# Patient Record
Sex: MALE
[De-identification: names, ages, dates, MRNs, and addresses within clinical notes are randomized per-mention and may not be internally consistent; named-entity substitution may affect disease eponyms.]

---

## 2020-06-29 ENCOUNTER — HOSPITAL ENCOUNTER (INPATIENT)
Dept: HOSPITAL 88 - ER | Age: 60
LOS: 8 days | Discharge: HOME | DRG: 871 | End: 2020-07-07
Attending: INTERNAL MEDICINE | Admitting: INTERNAL MEDICINE
Payer: SELF-PAY

## 2020-06-29 VITALS — DIASTOLIC BLOOD PRESSURE: 54 MMHG | SYSTOLIC BLOOD PRESSURE: 127 MMHG

## 2020-06-29 VITALS — DIASTOLIC BLOOD PRESSURE: 51 MMHG | SYSTOLIC BLOOD PRESSURE: 124 MMHG

## 2020-06-29 VITALS — SYSTOLIC BLOOD PRESSURE: 148 MMHG | DIASTOLIC BLOOD PRESSURE: 86 MMHG

## 2020-06-29 VITALS — DIASTOLIC BLOOD PRESSURE: 49 MMHG | SYSTOLIC BLOOD PRESSURE: 109 MMHG

## 2020-06-29 VITALS — SYSTOLIC BLOOD PRESSURE: 140 MMHG | DIASTOLIC BLOOD PRESSURE: 67 MMHG

## 2020-06-29 VITALS — DIASTOLIC BLOOD PRESSURE: 67 MMHG | SYSTOLIC BLOOD PRESSURE: 140 MMHG

## 2020-06-29 VITALS — DIASTOLIC BLOOD PRESSURE: 86 MMHG | SYSTOLIC BLOOD PRESSURE: 148 MMHG

## 2020-06-29 VITALS — BODY MASS INDEX: 24.71 KG/M2 | HEIGHT: 66 IN | WEIGHT: 153.75 LBS

## 2020-06-29 DIAGNOSIS — E87.1: ICD-10-CM

## 2020-06-29 DIAGNOSIS — N17.9: ICD-10-CM

## 2020-06-29 DIAGNOSIS — I48.91: ICD-10-CM

## 2020-06-29 DIAGNOSIS — J44.9: ICD-10-CM

## 2020-06-29 DIAGNOSIS — K44.9: ICD-10-CM

## 2020-06-29 DIAGNOSIS — Z79.01: ICD-10-CM

## 2020-06-29 DIAGNOSIS — G93.41: ICD-10-CM

## 2020-06-29 DIAGNOSIS — D62: ICD-10-CM

## 2020-06-29 DIAGNOSIS — K29.71: ICD-10-CM

## 2020-06-29 DIAGNOSIS — A41.9: Primary | ICD-10-CM

## 2020-06-29 DIAGNOSIS — E11.9: ICD-10-CM

## 2020-06-29 LAB
ALBUMIN SERPL-MCNC: 3.3 G/DL (ref 3.5–5)
ALBUMIN/GLOB SERPL: 1.2 {RATIO} (ref 0.8–2)
ALP SERPL-CCNC: 43 IU/L (ref 40–150)
ALT SERPL-CCNC: 17 IU/L (ref 0–55)
AMMONIA SER-MCNC: 33 UG/DL (ref 31–123)
ANION GAP SERPL CALC-SCNC: 12.6 MMOL/L (ref 8–16)
BASOPHILS # BLD AUTO: 0 10*3/UL (ref 0–0.1)
BASOPHILS # BLD AUTO: 0 10*3/UL (ref 0–0.1)
BASOPHILS NFR BLD AUTO: 0.3 % (ref 0–1)
BASOPHILS NFR BLD AUTO: 0.3 % (ref 0–1)
BUN SERPL-MCNC: 91 MG/DL (ref 7–26)
BUN/CREAT SERPL: 69 (ref 6–25)
CALCIUM SERPL-MCNC: 8.8 MG/DL (ref 8.4–10.2)
CHLORIDE SERPL-SCNC: 97 MMOL/L (ref 98–107)
CK MB SERPL-MCNC: 5.2 NG/ML (ref 0–5)
CK MB SERPL-MCNC: 5.3 NG/ML (ref 0–5)
CK MB SERPL-MCNC: 5.5 NG/ML (ref 0–5)
CK SERPL-CCNC: 134 IU/L (ref 30–200)
CK SERPL-CCNC: 152 IU/L (ref 30–200)
CK SERPL-CCNC: 203 IU/L (ref 30–200)
CO2 SERPL-SCNC: 29 MMOL/L (ref 22–29)
DEPRECATED APTT PLAS QN: 25.2 SECONDS (ref 23.8–35.5)
DEPRECATED INR PLAS: 1.05
DEPRECATED NEUTROPHILS # BLD AUTO: 11.5 10*3/UL (ref 2.1–6.9)
DEPRECATED NEUTROPHILS # BLD AUTO: 12.1 10*3/UL (ref 2.1–6.9)
EGFRCR SERPLBLD CKD-EPI 2021: 56 ML/MIN (ref 60–?)
EOSINOPHIL # BLD AUTO: 0 10*3/UL (ref 0–0.4)
EOSINOPHIL # BLD AUTO: 0 10*3/UL (ref 0–0.4)
EOSINOPHIL NFR BLD AUTO: 0 % (ref 0–6)
EOSINOPHIL NFR BLD AUTO: 0 % (ref 0–6)
ERYTHROCYTE [DISTWIDTH] IN CORD BLOOD: 12.9 % (ref 11.7–14.4)
ERYTHROCYTE [DISTWIDTH] IN CORD BLOOD: 13.2 % (ref 11.7–14.4)
GLOBULIN PLAS-MCNC: 2.7 G/DL (ref 2.3–3.5)
GLUCOSE SERPLBLD-MCNC: 320 MG/DL (ref 74–118)
HCT VFR BLD AUTO: 13 % (ref 38.2–49.6)
HCT VFR BLD AUTO: 13.7 % (ref 38.2–49.6)
HGB BLD-MCNC: 4.3 G/DL (ref 14–18)
HGB BLD-MCNC: 4.6 G/DL (ref 14–18)
LYMPHOCYTES # BLD: 2 10*3/UL (ref 1–3.2)
LYMPHOCYTES # BLD: 2 10*3/UL (ref 1–3.2)
LYMPHOCYTES NFR BLD AUTO: 12.9 % (ref 18–39.1)
LYMPHOCYTES NFR BLD AUTO: 13.4 % (ref 18–39.1)
MCH RBC QN AUTO: 28 PG (ref 28–32)
MCH RBC QN AUTO: 28.1 PG (ref 28–32)
MCHC RBC AUTO-ENTMCNC: 33.1 G/DL (ref 31–35)
MCHC RBC AUTO-ENTMCNC: 33.6 G/DL (ref 31–35)
MCV RBC AUTO: 83.5 FL (ref 81–99)
MCV RBC AUTO: 85 FL (ref 81–99)
MONOCYTES # BLD AUTO: 1 10*3/UL (ref 0.2–0.8)
MONOCYTES # BLD AUTO: 1.1 10*3/UL (ref 0.2–0.8)
MONOCYTES NFR BLD AUTO: 6.8 % (ref 4.4–11.3)
MONOCYTES NFR BLD AUTO: 7 % (ref 4.4–11.3)
NEUTS SEG NFR BLD AUTO: 78.7 % (ref 38.7–80)
NEUTS SEG NFR BLD AUTO: 78.8 % (ref 38.7–80)
PLATELET # BLD AUTO: 214 X10E3/UL (ref 140–360)
PLATELET # BLD AUTO: 253 X10E3/UL (ref 140–360)
POTASSIUM SERPL-SCNC: 4.6 MMOL/L (ref 3.5–5.1)
PROTHROMBIN TIME: 14.4 SECONDS (ref 11.9–14.5)
RBC # BLD AUTO: 1.53 X10E6/UL (ref 4.3–5.7)
RBC # BLD AUTO: 1.64 X10E6/UL (ref 4.3–5.7)
SODIUM SERPL-SCNC: 134 MMOL/L (ref 136–145)

## 2020-06-29 PROCEDURE — 85014 HEMATOCRIT: CPT

## 2020-06-29 PROCEDURE — 71045 X-RAY EXAM CHEST 1 VIEW: CPT

## 2020-06-29 PROCEDURE — 86850 RBC ANTIBODY SCREEN: CPT

## 2020-06-29 PROCEDURE — 85025 COMPLETE CBC W/AUTO DIFF WBC: CPT

## 2020-06-29 PROCEDURE — 82728 ASSAY OF FERRITIN: CPT

## 2020-06-29 PROCEDURE — 36415 COLL VENOUS BLD VENIPUNCTURE: CPT

## 2020-06-29 PROCEDURE — 70450 CT HEAD/BRAIN W/O DYE: CPT

## 2020-06-29 PROCEDURE — 83735 ASSAY OF MAGNESIUM: CPT

## 2020-06-29 PROCEDURE — 83605 ASSAY OF LACTIC ACID: CPT

## 2020-06-29 PROCEDURE — 86900 BLOOD TYPING SEROLOGIC ABO: CPT

## 2020-06-29 PROCEDURE — 94660 CPAP INITIATION&MGMT: CPT

## 2020-06-29 PROCEDURE — 99284 EMERGENCY DEPT VISIT MOD MDM: CPT

## 2020-06-29 PROCEDURE — 85610 PROTHROMBIN TIME: CPT

## 2020-06-29 PROCEDURE — 96372 THER/PROPH/DIAG INJ SC/IM: CPT

## 2020-06-29 PROCEDURE — 85730 THROMBOPLASTIN TIME PARTIAL: CPT

## 2020-06-29 PROCEDURE — 0DB68ZX EXCISION OF STOMACH, VIA NATURAL OR ARTIFICIAL OPENING ENDOSCOPIC, DIAGNOSTIC: ICD-10-PCS | Performed by: INTERNAL MEDICINE

## 2020-06-29 PROCEDURE — 93925 LOWER EXTREMITY STUDY: CPT

## 2020-06-29 PROCEDURE — 87635 SARS-COV-2 COVID-19 AMP PRB: CPT

## 2020-06-29 PROCEDURE — 88305 TISSUE EXAM BY PATHOLOGIST: CPT

## 2020-06-29 PROCEDURE — 85018 HEMOGLOBIN: CPT

## 2020-06-29 PROCEDURE — 83540 ASSAY OF IRON: CPT

## 2020-06-29 PROCEDURE — 84466 ASSAY OF TRANSFERRIN: CPT

## 2020-06-29 PROCEDURE — 82948 REAGENT STRIP/BLOOD GLUCOSE: CPT

## 2020-06-29 PROCEDURE — 82310 ASSAY OF CALCIUM: CPT

## 2020-06-29 PROCEDURE — 93005 ELECTROCARDIOGRAM TRACING: CPT

## 2020-06-29 PROCEDURE — 86920 COMPATIBILITY TEST SPIN: CPT

## 2020-06-29 PROCEDURE — 43239 EGD BIOPSY SINGLE/MULTIPLE: CPT

## 2020-06-29 PROCEDURE — 96361 HYDRATE IV INFUSION ADD-ON: CPT

## 2020-06-29 PROCEDURE — 0DB78ZX EXCISION OF STOMACH, PYLORUS, VIA NATURAL OR ARTIFICIAL OPENING ENDOSCOPIC, DIAGNOSTIC: ICD-10-PCS | Performed by: INTERNAL MEDICINE

## 2020-06-29 PROCEDURE — 81001 URINALYSIS AUTO W/SCOPE: CPT

## 2020-06-29 PROCEDURE — 74470 X-RAY EXAM OF KIDNEY LESION: CPT

## 2020-06-29 PROCEDURE — 85045 AUTOMATED RETICULOCYTE COUNT: CPT

## 2020-06-29 PROCEDURE — 80053 COMPREHEN METABOLIC PANEL: CPT

## 2020-06-29 PROCEDURE — 85651 RBC SED RATE NONAUTOMATED: CPT

## 2020-06-29 PROCEDURE — 82607 VITAMIN B-12: CPT

## 2020-06-29 PROCEDURE — 82140 ASSAY OF AMMONIA: CPT

## 2020-06-29 PROCEDURE — 84484 ASSAY OF TROPONIN QUANT: CPT

## 2020-06-29 PROCEDURE — 82746 ASSAY OF FOLIC ACID SERUM: CPT

## 2020-06-29 PROCEDURE — 97139 UNLISTED THERAPEUTIC PX: CPT

## 2020-06-29 PROCEDURE — 82550 ASSAY OF CK (CPK): CPT

## 2020-06-29 PROCEDURE — 36569 INSJ PICC 5 YR+ W/O IMAGING: CPT

## 2020-06-29 PROCEDURE — 86140 C-REACTIVE PROTEIN: CPT

## 2020-06-29 PROCEDURE — 87040 BLOOD CULTURE FOR BACTERIA: CPT

## 2020-06-29 PROCEDURE — 84443 ASSAY THYROID STIM HORMONE: CPT

## 2020-06-29 PROCEDURE — 78278 ACUTE GI BLOOD LOSS IMAGING: CPT

## 2020-06-29 PROCEDURE — 83036 HEMOGLOBIN GLYCOSYLATED A1C: CPT

## 2020-06-29 PROCEDURE — 80202 ASSAY OF VANCOMYCIN: CPT

## 2020-06-29 PROCEDURE — 71250 CT THORAX DX C-: CPT

## 2020-06-29 PROCEDURE — 93306 TTE W/DOPPLER COMPLETE: CPT

## 2020-06-29 PROCEDURE — 84100 ASSAY OF PHOSPHORUS: CPT

## 2020-06-29 PROCEDURE — 82553 CREATINE MB FRACTION: CPT

## 2020-06-29 PROCEDURE — 88312 SPECIAL STAINS GROUP 1: CPT

## 2020-06-29 PROCEDURE — 99251: CPT

## 2020-06-29 PROCEDURE — 80061 LIPID PANEL: CPT

## 2020-06-29 PROCEDURE — 82947 ASSAY GLUCOSE BLOOD QUANT: CPT

## 2020-06-29 PROCEDURE — 82270 OCCULT BLOOD FECES: CPT

## 2020-06-29 RX ADMIN — INSULIN LISPRO SCH UNIT: 100 INJECTION, SOLUTION INTRAVENOUS; SUBCUTANEOUS at 13:40

## 2020-06-29 RX ADMIN — INSULIN LISPRO SCH UNIT: 100 INJECTION, SOLUTION INTRAVENOUS; SUBCUTANEOUS at 21:00

## 2020-06-29 RX ADMIN — Medication SCH MLS/HR: at 22:17

## 2020-06-29 RX ADMIN — VANCOMYCIN HYDROCHLORIDE SCH MLS/HR: 500 INJECTION, SOLUTION INTRAVENOUS at 14:32

## 2020-06-29 RX ADMIN — OXYCODONE HYDROCHLORIDE AND ACETAMINOPHEN SCH MG: 500 TABLET ORAL at 17:00

## 2020-06-29 RX ADMIN — Medication SCH MLS/HR: at 22:12

## 2020-06-29 RX ADMIN — SODIUM CHLORIDE SCH MG: 900 INJECTION INTRAVENOUS at 19:54

## 2020-06-29 RX ADMIN — CEFEPIME HYDROCHLORIDE SCH MLS/HR: 1 INJECTION, POWDER, FOR SOLUTION INTRAMUSCULAR; INTRAVENOUS at 13:58

## 2020-06-29 RX ADMIN — Medication SCH MG: at 17:00

## 2020-06-29 RX ADMIN — INSULIN LISPRO SCH UNIT: 100 INJECTION, SOLUTION INTRAVENOUS; SUBCUTANEOUS at 20:02

## 2020-06-29 NOTE — CONSULTATION
DATE OF CONSULTATION:  06/29/2020  

 

REASON FOR CONSULTATION:  To see if the patient has loss of blood since he has a

hemoglobin of 4.3, it is coming from his foot. 

 

History could not be taken due to the fact that the patient is not responsive to verbal

stimuli. 

 

PHYSICAL EXAMINATION:

His foot was inspected.  He has a grade 1/2 ulceration plantar aspect right foot with no

active bleeding.  There is some mild periwound cellulitis present with pedal pulses

diminished. 

ASSESSMENT:  Grade 2 ulceration with cellulitis.

 

PLAN:  We will start Bactroban ointment b.i.d., offloading.  Upon discharge, the patient

is to follow up in the office.  The bleeding, loss of blood is not coming from his foot. 

 

 

 

 

______________________________

OLIVER Ulloa/KARIS

D:  06/29/2020 16:49:43

T:  06/29/2020 22:35:32

Job #:  [id number]

## 2020-06-29 NOTE — NUR
NURSE AND NP EPI JORDAN IN PATIENT'S ROOM OBSERVING HIS APNEA EPISODES, DAUGHTER AT BEDSIDE 
STATING SHE HEARS HIM AT HOME FOR YEARS SNORING AND SLEEPING LIKE THIS BUT SHE'S NEVER SEEN 
HIM IN PERSON DO THIS SINCE HE MOVED TO HER OTHER SISTER'S HOUSE. BIPAP ORDERED FOR PATIENT. 
EPI JORDAN, NP, DR DAWKINS AND NURSE DISCUSSED PT CARE AT DESK AND CONCLUDED THAT PT NEEDS 
PULMONARY CONSULT DUE TO THE SEVERE APNEA SINCE DR DAWKINS NEEDS TO DO EGD/COLONOSCOPY BUT IS 
CONCERNED ABOUT ANESTHESIA WITH APNEA THIS SEVERE. CONSULT PLACED FOR DR TENORIO TO ASSESS 
PATIENT AND CLEAR PATIENT FOR PROCEDURE. PATIENT NOW ON BIPAP 14/9 16R 30% O2 AND RESTING 
COMFORTABLY WITH O2 %, DAUGHTER STILL AT BEDSIDE AS SITTER AND PT STILL RESTRAINED AT 
THIS TIME. WILL CONT TO MONITOR.

## 2020-06-29 NOTE — EMERGENCY DEPARTMENT NOTE
History of Present Illnes


History of Present Illness


Chief Complaint:  General Medicine Complaints


History of Present Illness


This is a 59 year old  male 60 Y/O MALE PT ALERT TO PERSON AND PLACE 

PRESENTS TO ED PALE IN COLOR WITH WEAKNESS SINCE YESTERDAY. +BLACK STOOLS


Historian:  Patient


Arrival Mode:  Car


 Required:  No


Onset (how long ago):  day(s) (2)


Radiation:  Reports non-radiation


Severity:  moderate


Onset quality:  gradual


Timing of current episode:  constant


Progression:  worsening


Chronicity:  new


Context:  Denies recent illness


Relieving factors:  none


Exacerbating factors:  none


Associated symptoms:  Reports denies other symptoms, Reports weakness


Treatments prior to arrival:  none





Past Medical/Family History


Physician Review


I have reviewed the patient's past medical and family history.  Any updates have

been documented here.





Past Medical History


Recent Fever:  No


Clinical Suspicion of Infectio:  Yes


New/Unexplained Change in Ment:  Yes


Past Medical History:  Diabetes


Past Surgical History:  None





Social History


Smoking Cessation:  Never Smoker


Counseling Performed:  No


Alcohol Use:  Occasional


Any Illegal Drug Use:  No


TB Exposure/Symptoms:  No


Physically hurt or threatened:  No





Family History


Family history of heart diseas:  No





Other


Last Tetanus:  UNK


Any Pre-Existing Lines (PICC,:  No


Is patient up to date on immun:  Yes


Last Flu:  unknown


Last Pneumovax:  unknown





Review of Systems


Review of Systems


Constitutional:  Reports as per HPI, Reports weakness


EENTM:  Reports no symptoms


Cardiovascular:  Reports no symptoms


Respiratory:  Reports no symptoms


Gastrointestinal:  Reports other (BLACK STOOLS)


Genitourinary:  Reports no symptoms


Musculoskeletal:  Reports no symptoms


Integumentary:  Reports no symptoms


Neurological:  Reports weakness


Psychological:  Reports no symptoms


Endocrine:  Reports no symptoms


Hematological/Lymphatic:  Reports no symptoms





Physical Exam


Related Data


Allergies:  


Coded Allergies:  


     No Known Allergies (Unverified , 6/29/20)


Triage Vital Signs





Vital Signs








  Date Time  Temp Pulse Resp B/P (MAP) Pulse Ox O2 Delivery O2 Flow Rate FiO2


 


6/29/20 07:06 99.8 102 14 151/64 100   








Vital signs reviewed:  Yes





Physical Exam


CONSTITUTIONAL





Constitutional:  Present well-developed, Present well-nourished


HENT


HENT:  Present normocephalic, Present atraumatic, Present oropharynx 

clear/moist, Present nose normal


HENT L/R:  Present left ext ear normal, Present right ext ear normal


EYES





Eyes:  Reports PERRL, Reports conjunctivae normal


NECK


Neck:  Present ROM normal


PULMONARY


Pulmonary:  Present effort normal, Present breath sounds normal


CARDIOVASCULAR





Cardiovascular:  Present regular rhythm, Present heart sounds normal, Present 

capillary refill normal, Present normal rate


GASTROINTESTINAL





Abdominal:  Present soft, Present nontender, Present bowel sounds normal; 


   Absent tender


GENITOURINARY





Genitourinary:  Present guaiac result (POSITIVE, MELENA)


SKIN


Skin:  Present warm, Present dry, Present pale


MUSCULOSKELETAL





Musculoskeletal:  Present ROM normal


NEUROLOGICAL





Neurological:  Present alert, Present DTRs normal, Present no gross motor or 

sensory deficits, Present other (ORIENTED TO NAME/PLACE, NOT YEAR); 


   Absent cranial nerve deficit, Absent sensory deficit, Absent weakness


PSYCHOLOGICAL


Psychological:  Present mood/affect normal, Present judgement normal





Results


Laboratory


Result Diagram:  


6/29/20 0640                                                                    

           6/29/20 0640





Laboratory





Laboratory Tests








Test


 6/29/20


07:25 6/29/20


06:40


 


Stool Occult Blood


 Positive


(NEGATIVE) 





 


White Blood Count


 


 14.61 x10e3/uL


(4.8-10.8)


 


Red Blood Count


 


 1.64 x10e6/uL


(4.3-5.7)


 


Hemoglobin


 


 4.6 g/dL


(14.0-18.0)


 


Hematocrit


 


 13.7 %


(38.2-49.6)


 


Mean Corpuscular Volume


 


 83.5 fL


(81-99)


 


Mean Corpuscular Hemoglobin


 


 28.0 pg


(28-32)


 


Mean Corpuscular Hemoglobin


Concent 


 33.6 g/dL


(31-35)


 


Red Cell Distribution Width


 


 12.9 %


(11.7-14.4)


 


Platelet Count


 


 253 x10e3/uL


(140-360)


 


Neutrophils (%) (Auto)


 


 78.7 %


(38.7-80.0)


 


Lymphocytes (%) (Auto)


 


 13.4 %


(18.0-39.1)


 


Monocytes (%) (Auto)


 


 6.8  %


(4.4-11.3)


 


Eosinophils (%) (Auto)


 


 0.0 %


(0.0-6.0)


 


Basophils (%) (Auto)


 


 0.3 %


(0.0-1.0)


 


Neutrophils # (Auto)  11.5 (2.1-6.9) 


 


Lymphocytes # (Auto)  2.0 (1.0-3.2) 


 


Monocytes # (Auto)  1.0 (0.2-0.8) 


 


Eosinophils # (Auto)  0.0 (0.0-0.4) 


 


Basophils # (Auto)  0.0 (0.0-0.1) 


 


Absolute Immature Granulocyte


(auto 


 0.11 x10e3/uL


(0-0.1)


 


Sodium Level


 


 134 mmol/L


(136-145)


 


Potassium Level


 


 4.6 mmol/L


(3.5-5.1)


 


Chloride Level


 


 97 mmol/L


()


 


Carbon Dioxide Level


 


 29 mmol/L


(22-29)


 


Anion Gap


 


 12.6 mmol/L


(8-16)


 


Blood Urea Nitrogen


 


 91 mg/dL


(7-26)


 


Creatinine


 


 1.31 mg/dL


(0.72-1.25)


 


Estimat Glomerular Filtration


Rate 


 56 ML/MIN


(60-)


 


BUN/Creatinine Ratio  69 (6-25) 


 


Glucose Level


 


 320 mg/dL


()


 


Lactic Acid Level


 


 1.3 mmol/L


(0.5-2.0)


 


Calcium Level


 


 8.8 mg/dL


(8.4-10.2)


 


Total Bilirubin


 


 0.2 mg/dL


(0.2-1.2)


 


Aspartate Amino Transf


(AST/SGOT) 


 16 IU/L (5-34) 





 


Alanine Aminotransferase


(ALT/SGPT) 


 17 IU/L (0-55) 





 


Alkaline Phosphatase


 


 43 IU/L


()


 


Ammonia


 


 33 UG/DL


()


 


Creatine Kinase


 


 134 IU/L


()


 


Creatine Kinase MB


 


 5.30 ng/mL


(0-5.0)


 


Troponin I


 


 0.014 ng/mL


(0-0.300)


 


Total Protein


 


 6.0 g/dL


(6.5-8.1)


 


Albumin


 


 3.3 g/dL


(3.5-5.0)


 


Globulin


 


 2.7 g/dL


(2.3-3.5)


 


Albumin/Globulin Ratio  1.2 (0.8-2.0) 








Lab results reviewed:  Yes





Imaging


Imaging results reviewed:  Yes


Impressions


Procedure: [id number] CT/CT BRAIN WO


Exam Date: 06/29/20                            Exam Time: 0758








                              REPORT STATUS: Signed





CT BRAIN WO





HISTORY: Altered mental status





COMPARISON:  None.





Technique: 


Noncontrast axial scans were obtained from skull base to the vertex.  Coronal


and sagittal reconstructions obtained from the axial data.  One or more of the


following dose reduction techniques were used: Automated exposure control,


adjustment of the mA and/or kV according to patient size, and/or utilization of


iterative reconstruction technique.





DISCUSSION:





Scalp/Skull: Unremarkable.


Brain sulci: Mildly prominent.


Ventricles: Mild compensatory dilatation. Approximately 5 mm nodular hyperdense


lesion in the roof of the third ventricle is compatible with a colloid cyst.


Extra-axial spaces: No masses or fluid collections. Carotid siphon


calcifications are present.





Parenchyma: 


Mild bilateral deep white matter hypodensity is likely chronic microvascular


ischemic change. 


Otherwise, no masses, hemorrhage, or large vascular territory acute infarct.





Dural sinuses:  No abnormal densities.


Sellar/Suprasellar region: Intact.


Skull base: Intact.


Incidental findings: None.





IMPRESSION:


1.  No acute intracranial abnormalities.


2.  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


foramina of Erick.


3.  Mild supratentorial chronic microvascular ischemic change. Mild generalized


cerebral volume loss.








Signed by: Dr. Oswald Maradiaga M.D. on 6/29/2020 8:35 AM








Procedure: [id number] DX/CHEST SINGLE (PORTABLE)


Exam Date: 06/29/20                            Exam Time: 0758








                              REPORT STATUS: Signed





EXAMINATION:  CHEST SINGLE (PORTABLE)    





INDICATION: Altered mental status





COMPARISON: None


     


FINDINGS:





LINES/TUBES:None





LUNGS:The lungs are moderately inflated. No focal consolidation or pulmonary


edema.





PLEURA:No pleural effusion or pneumothorax.





MEDIASTINUM:The cardiomediastinal silhouette appears normal in size and shape.





BONES/SOFT TISSUES:No acute osseous injury.





ABDOMEN:No free air under the diaphragm.








IMPRESSION: 


No focal pneumonia or pulmonary edema.





Signed by: Nithya Platt MD on 6/29/2020 8:30 AM





Critical Care Time


Total Critical Care Time (min):  30


Critcal care necessary due to:  circulatory failure


Critcal care time spent by me:  discussion w consultants, discussion w primary 

provider, evaluation patient response to tx, examination of patient, 

order/perform tx or interventions, order/review laboratory studies, re-

evaluation of patient condition





Assessment & Plan


Medical Decision Making


MDM


AMS, WEAKNESS, APPEARS PALE - CHECK CBC, CHEM, ECG, CARDIACS, CXR, COVID SWAB, 

UA/CX, BLD CX'S, LACTIC, CT HEAD, STOOL GAUIAC - R/O ANEMIA, GI BLEED, 

STEMI/NSTEMI, PNEUMONIA, UTI, SEPSIS, CEREBRAL BLEED/CVA, COVID19





Reassessment


Reassessment


SEVERE ANEMIA/UPPER GI BLEED WITH NORMAL LFT'S - IVF'S, PROTONIX, OCTREOTIDE, 

TRANSFUSE, ADMIT - SPOKE WITH DANIELA DAWKINS





Assessment & Plan


Final Impression:  


(1) GI bleed


(2) Altered mental status


(3) Weakness generalized


Depart Disposition:  ADMITTED


Last Vital Signs











  Date Time  Temp Pulse Resp B/P (MAP) Pulse Ox O2 Delivery O2 Flow Rate FiO2


 


6/29/20 07:06 99.8 102 14 151/64 100   








Medications in the ED





Aspirin 81 mg PRN  ONCE PO ;  Start 6/29/20 at 07:00;  Stop 6/29/20 at 08:56;  

Status DC


Sodium Chloride 250 ml @ 0 mls/hr ONCE  ONCE IV ;  Start 6/29/20 at 07:30;  Stop

6/29/20 at 08:56;  Status DC


Pantoprazole Sodium 80 mg NOW  STAT IV ;  Start 6/29/20 at 09:08;  Stop 6/29/20 

at 09:09;  Status UNV


Pantoprazole Sodium 40 mg BID IV ;  Start 6/29/20 at 17:00;  Stop 7/29/20 at 

16:59;  Status UNV


Sodium Chloride 1,000 ml @  0 mls/hr Q0M STAT IV ;  Start 6/29/20 at 09:08;  

Stop 6/29/20 at 09:09;  Status UNV











ARIADNA HARTLEY MD               Jun 29, 2020 09:34

## 2020-06-29 NOTE — NUR
Patient is awake and alert to self intermittent confusion, pulses present to bilateral wrist 
, he is NPO, blood infusing, agitated, skin to bilateral wrist intact. Restraints continue 
to be use for safety of patient, and line preservation. Cefepime antibiotic completed.

## 2020-06-29 NOTE — NUR
Handoff report to oncoming nurse, patient in bed calm daughter at the bedside, restraints 
removed patient conversing appropriately. Second unit of PRBC's infusing, to right wrist, 
PICC line in place, chest x-ray taken, PICC ready to use, informed, nurse, Octreotide drip 
unable to start due to patient, continuance of pulling IV's access.

## 2020-06-29 NOTE — NUR
PATIENT TELE ALARM GOING OFF NURSE WENT TO ROOM TO ASSESS PATIENT AND PATIENT NOTED TO BE 
HAVING EXTREME SLEEP APNEA PERIODS WITH DESATING INTO THE 70S DESPITE 4L/NC O2 AND PATIENT 
HAS LONG PERIODS OF APNEA AS WELL AS CHOKING EPISODES. DAUGHTER TOM STILL AT BEDSIDE TO 
WITNESS PATIENT'S APNEA EPISODES. NURSE ED DAUGHTER THAT ONCE OUT OF THE HOSPITAL HE WILL 
NEED SLEEP STUDY AND POSSIBLE HOME BIPAP USE. CALLED EPI JORDAN NP FOR DR PACE AND 
NOTIFIED OF PATIENT'S EXTREME APNEA EPISODES. HE STATES HE WILL COME SEE THE PATIENT.

## 2020-06-29 NOTE — DIAGNOSTIC IMAGING REPORT
CT BRAIN WO



HISTORY: Altered mental status



COMPARISON:  None.



Technique: 

Noncontrast axial scans were obtained from skull base to the vertex.  Coronal

and sagittal reconstructions obtained from the axial data.  One or more of the

following dose reduction techniques were used: Automated exposure control,

adjustment of the mA and/or kV according to patient size, and/or utilization of

iterative reconstruction technique.



DISCUSSION:



Scalp/Skull: Unremarkable.

Brain sulci: Mildly prominent.

Ventricles: Mild compensatory dilatation. Approximately 5 mm nodular hyperdense

lesion in the roof of the third ventricle is compatible with a colloid cyst.

Extra-axial spaces: No masses or fluid collections. Carotid siphon

calcifications are present.



Parenchyma: 

Mild bilateral deep white matter hypodensity is likely chronic microvascular

ischemic change. 

Otherwise, no masses, hemorrhage, or large vascular territory acute infarct.



Dural sinuses:  No abnormal densities.

Sellar/Suprasellar region: Intact.

Skull base: Intact.

Incidental findings: None.



IMPRESSION:

1.  No acute intracranial abnormalities.

2.  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

foramina of Erick.

3.  Mild supratentorial chronic microvascular ischemic change. Mild generalized

cerebral volume loss.





Signed by: Dr. Oswald Maradiaga M.D. on 6/29/2020 8:35 AM

## 2020-06-29 NOTE — NUR
Called Dr. Bond, made aware Octreotide was started by night nurse due to patient's pulling 
IV access.

## 2020-06-29 NOTE — NUR
checked on patient restraints, pulses present, patient continues to be confused, and wanting 
to remove IV access, restraints continue to be needed for safety of patient. and line 
maintenance.

## 2020-06-29 NOTE — NUR
PATIENT'S DAUGHTER TOM CAME TO THE NURSE DESK STATING "HE PULLED OUT THE THING IN HIS 
ARM AND BLEEDING ALL OVER". NURSE ENTERED ROOM AND PATIENT WAS SITTING UP WITH BLOOD COMING 
DOWN THE ARM WHERE HE HAD PULLED OUT IV WITH BLOOD INFUSING AND HIS PICC LINE WITH 
SANDOSTATIN DRIP, REMOVED THE RESTRAINTS FROM HIS WRISTS, NASAL CANNULA AND WAS SAYING HE 
WANTS TO GO HOME. PATIENT WAS CLEANED UP AND NURSE WAS ABLE TO GET ONE IV GOING AGAIN FOR 
THE BLOOD TO CONTINUE AND A STAT ORDER WAS PLACED FOR ANOTHER PICC LINE TO BE PUT IN PATIENT 
AGAIN. PATIENT WAS RESTRAINED AGAIN AND NURSE NOTIFIED EPI JORDAN NP FOR DR PACE AND 
ORDER WAS PUT IN FOR IV ATIVAN DUE TO PATIENT'S AGITATION. DAUGHTER EDUCATED THAT NO ONE BUT 
STAFF CAN REMOVE THE RESTRAINTS AND TO LET HER SISTER KNOW NOT TO REMOVE PATIENT RESTRAINTS 
AGAIN AND THAT IF THEY WERE GOING TO STAY AS SITTERS THEY CANNOT LEAVE THE ROOM WITHOUT 
NOTIFYING NURSING STAFF TO KEEP THEIR DAD SAFE. AFTER ADMINISTERING IV ATIVAN PATIENT WAS 
CALM AND WENT TO SLEEP WITH SNORING.  WILL CONT TO MONITOR.

## 2020-06-29 NOTE — NUR
Patient is awake and alert to self intermittent confusion, pulses present to bilateral wrist 
, he is NPO, blood infusing, agitated, skin to bilateral wrist intact. Restraints continue 
to be use for safety of patient, and line preservation.

## 2020-06-29 NOTE — DIAGNOSTIC IMAGING REPORT
EXAMINATION:  CHEST SINGLE (PORTABLE)    



INDICATION: PICC line placement.



COMPARISON: 6/29/20.

     

FINDINGS:



LINES/TUBES:Interval placement of a left upper extremity PICC with distal tip

projecting on the cavoatrial junction, adequate position.



LUNGS:The lungs are adequately inflated. No focal consolidation or pulmonary

edema.



PLEURA:No pleural effusion or pneumothorax.



MEDIASTINUM:The cardiomediastinal silhouette appears normal in size and shape.

Calcification of the aortic arch again observed.



BONES/SOFT TISSUES:No acute osseous injury.



ABDOMEN:No free air under the diaphragm.





IMPRESSION: 



Left upper extremity PICC with distal tip projecting on the cavoatrial

junction, adequate position.



Signed by: Dr. LOBO Prakash M.D. on 6/29/2020 5:58 PM

## 2020-06-29 NOTE — NUR
Patient is awake and alert and confused  sanding at the side of the bed, removed both 
restraints, and pulled both IV access,  blood almost completed, called for help, manger came 
in to assist and patient was assisted back to bed. Restraints were applied back.  pulses 
present to bilateral wrist , he is NPO, blood infusing, patient calm, skin to bilateral 
wrist intact. Restraints continue to be use for safety of patient, and line preservation.

## 2020-06-29 NOTE — NUR
Patient pulled IV access got up the bed confused, notified Rafael Antoine and obtained an order 
for restraints to bilateral wrist.

## 2020-06-29 NOTE — DIAGNOSTIC IMAGING REPORT
EXAMINATION:  CHEST SINGLE (PORTABLE)    



INDICATION: Altered mental status



COMPARISON: None

     

FINDINGS:



LINES/TUBES:None



LUNGS:The lungs are moderately inflated. No focal consolidation or pulmonary

edema.



PLEURA:No pleural effusion or pneumothorax.



MEDIASTINUM:The cardiomediastinal silhouette appears normal in size and shape.



BONES/SOFT TISSUES:No acute osseous injury.



ABDOMEN:No free air under the diaphragm.





IMPRESSION: 

No focal pneumonia or pulmonary edema.



Signed by: Nithya Platt MD on 6/29/2020 8:30 AM

## 2020-06-29 NOTE — NUR
PATIENT'S DAUGHTER CAME TO NURSE STATION STATING SHE WAS LEAVING NOW AND GOING TO THE FRONT 
TO LET HER SISTER COME IN TO SIT WITH HIM OVERNIGHT. VERIFIED HER CONTACT INFORMATION THEN 
SHE LEFT. I THEN SAW PATIENT'S OTHER DAUGHTER TOM COME BY AND DIRECTED HER TO THE ROOM.

## 2020-06-29 NOTE — NUR
PATIENT IN BED WITH DAUGHTER AT BEDSIDE WITH NO C/O PAIN OR DISCOMFORT AT THIS TIME. PATIENT 
HAS BLOOD INFUSING TO IV IN RIGHT ARM AND PICC LINE IN LEFT ARM SO STARTED THE SANDOSTATIN 
DRIP AND IV NS AT 100ML/HR. WILL CONT TO MONITOR.

## 2020-06-29 NOTE — NUR
Patient is awake and alert to self intermittent confusion, pulses present to bilateral wrist 
, he is NPO, blood infusing, patient calm, skin to bilateral wrist intact. Restraints 
continue to be use for safety of patient, and line preservation. VANCOMYCIN 500 antibiotic 
infusing.

## 2020-06-29 NOTE — NUR
NURSE ASSESSED PATIENT AND MADE SURE PATIENT WAS SECURE IN RESTRAINTS AS DAUGHTER STATED SHE 
WAS ABOUT TO GO TO THE FRONT AND LET HER SISTER COME IN AND REPLACE HER FOR THE NIGHT 
SITTER. PATIENT WAS STABLE AND STILL RECEIVING BLOOD THROUGH IV.

## 2020-06-29 NOTE — NUR
Patient placed on bilateral wrist restraints, and able to obtain IV access and restart IV 
fluids on patient.

## 2020-06-29 NOTE — CONSULTATION
DATE OF CONSULTATION:    

 

HISTORY OF PRESENT ILLNESS:  This is a 59-year-old  male.  Apparently, he was

doing well yesterday, came back from work.  He is confused.  There was no fever and no

chills.  He came to the emergency room, where he is being admitted.  The patient does

not provide any further information, but he was found to be extremely anemic with

hemoglobin of 4.3, is getting blood.  The lactic acid was done and shows that it

elevated, so Infectious Disease was asked to see the patient.  His COVID-19 is negative.

 His occult blood was positive.  Sodium 134, potassium 4.6, and creatinine 1.31.  His

liver enzyme within normal limit.  His white count was 14.6, hemoglobin of 4.6, and

platelet count of 53. 

 

MEDICATIONS:  He is on vancomycin and cefepime __________ he is receiving blood at the

present time. 

 

LABORATORY AND DIAGNOSTIC DATA:  He had a chest x-ray, which shows no focal pneumonia

and CT of the brain, which shows no acute finding. 

 

REVIEW OF SYSTEMS:

CONSTITUTIONAL:  He is a little bit confused, but seems to be comfortable.

 

PHYSICAL EXAMINATION:

GENERAL:  He is currently alert. 

VITAL SIGNS:  Stable, afebrile. 

HEENT:  He is not icteric. 

NECK:  Supple. 

CHEST:  Clear. 

HEART:  S1, S2, no murmurs. 

ABDOMEN:  Soft.  Bowel sounds was present. 

EXTREMITIES:  No edema. 

SKIN:  No rash.

IMPRESSION:  Altered mental status and severe anemia, admission concerned about GI loss.

 

RECOMMEND:  GI evaluation.  Blood transfusion is ordered.  There is no fever at present

time.  Lactic acid and leukocytosis could be reactive with acute loss.  Recommend to put

him on Rocephin 1 g daily, to await the blood cultures.  We will also obtain CT of the

abdomen and pelvis to assess the liver status.  We will follow with you clinically. 

 

 

 

 

______________________________

MD BRNADON Chester/KARIS

D:  06/29/2020 14:35:22

T:  06/29/2020 21:16:11

Job #:  [id number]

## 2020-06-29 NOTE — NUR
RECEIVED PATIENT FROM ER PATIENT ARRIVED IN STRETCHER CONFUSED ALERT TO SELF ONLY. HAS TWO 
IV TO BILATERAL RIGHT AND LEFT AC, PLACED ON IV FLUIDS. REQUIRED 3 PERSON ASSIST TO 
TRANSFER, HE APPEARED PALE, WAITING ON BLOOD. ORIENTED TO ROOM AND USE OF CALL LIGHT PATIENT 
CONFUSED UNABLE TO VERBALIZE UNDERSTANDING TO USE CALL LIGHT.

## 2020-06-30 VITALS — DIASTOLIC BLOOD PRESSURE: 56 MMHG | SYSTOLIC BLOOD PRESSURE: 127 MMHG

## 2020-06-30 VITALS — SYSTOLIC BLOOD PRESSURE: 175 MMHG | DIASTOLIC BLOOD PRESSURE: 73 MMHG

## 2020-06-30 VITALS — SYSTOLIC BLOOD PRESSURE: 118 MMHG | DIASTOLIC BLOOD PRESSURE: 54 MMHG

## 2020-06-30 VITALS — SYSTOLIC BLOOD PRESSURE: 189 MMHG | DIASTOLIC BLOOD PRESSURE: 82 MMHG

## 2020-06-30 VITALS — SYSTOLIC BLOOD PRESSURE: 121 MMHG | DIASTOLIC BLOOD PRESSURE: 63 MMHG

## 2020-06-30 VITALS — DIASTOLIC BLOOD PRESSURE: 66 MMHG | SYSTOLIC BLOOD PRESSURE: 145 MMHG

## 2020-06-30 VITALS — DIASTOLIC BLOOD PRESSURE: 70 MMHG | SYSTOLIC BLOOD PRESSURE: 150 MMHG

## 2020-06-30 VITALS — DIASTOLIC BLOOD PRESSURE: 54 MMHG | SYSTOLIC BLOOD PRESSURE: 118 MMHG

## 2020-06-30 VITALS — SYSTOLIC BLOOD PRESSURE: 127 MMHG | DIASTOLIC BLOOD PRESSURE: 57 MMHG

## 2020-06-30 VITALS — DIASTOLIC BLOOD PRESSURE: 73 MMHG | SYSTOLIC BLOOD PRESSURE: 175 MMHG

## 2020-06-30 LAB
ALBUMIN SERPL-MCNC: 3.3 G/DL (ref 3.5–5)
ALBUMIN/GLOB SERPL: 1.1 {RATIO} (ref 0.8–2)
ALP SERPL-CCNC: 46 IU/L (ref 40–150)
ALT SERPL-CCNC: 18 IU/L (ref 0–55)
ANION GAP SERPL CALC-SCNC: 11.5 MMOL/L (ref 8–16)
BASOPHILS # BLD AUTO: 0.1 10*3/UL (ref 0–0.1)
BASOPHILS # BLD AUTO: 0.1 10*3/UL (ref 0–0.1)
BASOPHILS NFR BLD AUTO: 0.5 % (ref 0–1)
BASOPHILS NFR BLD AUTO: 0.6 % (ref 0–1)
BUN SERPL-MCNC: 45 MG/DL (ref 7–26)
BUN/CREAT SERPL: 46 (ref 6–25)
CALCIUM SERPL-MCNC: 8.6 MG/DL (ref 8.4–10.2)
CHLORIDE SERPL-SCNC: 108 MMOL/L (ref 98–107)
CHOLEST SERPL-MCNC: 108 MD/DL (ref 0–199)
CHOLEST/HDLC SERPL: 4.2 {RATIO} (ref 3.9–4.7)
CK MB SERPL-MCNC: 5.2 NG/ML (ref 0–5)
CK SERPL-CCNC: 233 IU/L (ref 30–200)
CO2 SERPL-SCNC: 29 MMOL/L (ref 22–29)
DEPRECATED NEUTROPHILS # BLD AUTO: 7.1 10*3/UL (ref 2.1–6.9)
DEPRECATED NEUTROPHILS # BLD AUTO: 8.5 10*3/UL (ref 2.1–6.9)
DEPRECATED PHOSPHATE SERPL-MCNC: 3 MG/DL (ref 2.3–4.7)
EGFRCR SERPLBLD CKD-EPI 2021: > 60 ML/MIN (ref 60–?)
EOSINOPHIL # BLD AUTO: 0 10*3/UL (ref 0–0.4)
EOSINOPHIL # BLD AUTO: 0.1 10*3/UL (ref 0–0.4)
EOSINOPHIL NFR BLD AUTO: 0.1 % (ref 0–6)
EOSINOPHIL NFR BLD AUTO: 0.6 % (ref 0–6)
ERYTHROCYTE [DISTWIDTH] IN CORD BLOOD: 14.9 % (ref 11.7–14.4)
ERYTHROCYTE [DISTWIDTH] IN CORD BLOOD: 15.1 % (ref 11.7–14.4)
FERRITIN SERPL-MCNC: 88.71 NG/ML (ref 21.81–274.66)
GLOBULIN PLAS-MCNC: 2.9 G/DL (ref 2.3–3.5)
GLUCOSE SERPLBLD-MCNC: 119 MG/DL (ref 74–118)
HCT VFR BLD AUTO: 21.3 % (ref 38.2–49.6)
HCT VFR BLD AUTO: 22.3 % (ref 38.2–49.6)
HDLC SERPL-MSCNC: 26 MG/DL (ref 40–60)
HGB BLD-MCNC: 7 G/DL (ref 14–18)
HGB BLD-MCNC: 7.5 G/DL (ref 14–18)
IRON SATN MFR SERPL: 73 % (ref 15–50)
IRON SERPL-MCNC: 231 UG/DL (ref 65–175)
LDLC SERPL CALC-MCNC: 53 MG/DL (ref 60–130)
LYMPHOCYTES # BLD: 2.1 10*3/UL (ref 1–3.2)
LYMPHOCYTES # BLD: 4 10*3/UL (ref 1–3.2)
LYMPHOCYTES NFR BLD AUTO: 20.4 % (ref 18–39.1)
LYMPHOCYTES NFR BLD AUTO: 28.3 % (ref 18–39.1)
MAGNESIUM SERPL-MCNC: 2.1 MG/DL (ref 1.3–2.1)
MCH RBC QN AUTO: 28.3 PG (ref 28–32)
MCH RBC QN AUTO: 28.5 PG (ref 28–32)
MCHC RBC AUTO-ENTMCNC: 32.9 G/DL (ref 31–35)
MCHC RBC AUTO-ENTMCNC: 33.6 G/DL (ref 31–35)
MCV RBC AUTO: 84.8 FL (ref 81–99)
MCV RBC AUTO: 86.2 FL (ref 81–99)
MONOCYTES # BLD AUTO: 0.9 10*3/UL (ref 0.2–0.8)
MONOCYTES # BLD AUTO: 1.4 10*3/UL (ref 0.2–0.8)
MONOCYTES NFR BLD AUTO: 8.8 % (ref 4.4–11.3)
MONOCYTES NFR BLD AUTO: 9.7 % (ref 4.4–11.3)
NEUTS SEG NFR BLD AUTO: 60 % (ref 38.7–80)
NEUTS SEG NFR BLD AUTO: 68.3 % (ref 38.7–80)
PLATELET # BLD AUTO: 213 X10E3/UL (ref 140–360)
PLATELET # BLD AUTO: 228 X10E3/UL (ref 140–360)
POTASSIUM SERPL-SCNC: 4.5 MMOL/L (ref 3.5–5.1)
RBC # BLD AUTO: 2.47 X10E6/UL (ref 4.3–5.7)
RBC # BLD AUTO: 2.63 X10E6/UL (ref 4.3–5.7)
SODIUM SERPL-SCNC: 144 MMOL/L (ref 136–145)
TIBC SERPL-MCNC: 315 UG/DL (ref 261–478)
TRANSFERRIN SERPL-MCNC: 225 MG/DL (ref 174–364)
TRIGL SERPL-MCNC: 144 MG/DL (ref 0–149)
TSH SERPL DL<=0.005 MIU/L-ACNC: 0.22 UIU/ML (ref 0.35–4.94)

## 2020-06-30 RX ADMIN — Medication SCH MG: at 15:59

## 2020-06-30 RX ADMIN — SODIUM CHLORIDE SCH MLS/HR: 9 INJECTION, SOLUTION INTRAVENOUS at 15:59

## 2020-06-30 RX ADMIN — VANCOMYCIN HYDROCHLORIDE SCH MLS/HR: 500 INJECTION, SOLUTION INTRAVENOUS at 15:59

## 2020-06-30 RX ADMIN — VANCOMYCIN HYDROCHLORIDE SCH MLS/HR: 500 INJECTION, SOLUTION INTRAVENOUS at 04:27

## 2020-06-30 RX ADMIN — INSULIN LISPRO SCH UNIT: 100 INJECTION, SOLUTION INTRAVENOUS; SUBCUTANEOUS at 16:13

## 2020-06-30 RX ADMIN — Medication SCH MLS/HR: at 06:07

## 2020-06-30 RX ADMIN — LORAZEPAM PRN MG: 2 INJECTION INTRAMUSCULAR; INTRAVENOUS at 02:32

## 2020-06-30 RX ADMIN — Medication SCH MG: at 07:43

## 2020-06-30 RX ADMIN — SODIUM CHLORIDE SCH MLS/HR: 9 INJECTION, SOLUTION INTRAVENOUS at 05:33

## 2020-06-30 RX ADMIN — BACITRACIN SCH GM: 500 OINTMENT TOPICAL at 08:46

## 2020-06-30 RX ADMIN — HYDRALAZINE HYDROCHLORIDE PRN MG: 20 INJECTION INTRAMUSCULAR; INTRAVENOUS at 20:35

## 2020-06-30 RX ADMIN — Medication SCH MLS/HR: at 19:56

## 2020-06-30 RX ADMIN — SODIUM CHLORIDE SCH MG: 900 INJECTION INTRAVENOUS at 17:15

## 2020-06-30 RX ADMIN — Medication SCH MLS/HR: at 08:47

## 2020-06-30 RX ADMIN — LORAZEPAM PRN MG: 2 INJECTION INTRAMUSCULAR; INTRAVENOUS at 12:57

## 2020-06-30 RX ADMIN — OXYCODONE HYDROCHLORIDE AND ACETAMINOPHEN SCH MG: 500 TABLET ORAL at 07:43

## 2020-06-30 RX ADMIN — INSULIN LISPRO SCH UNIT: 100 INJECTION, SOLUTION INTRAVENOUS; SUBCUTANEOUS at 11:30

## 2020-06-30 RX ADMIN — INSULIN LISPRO SCH UNIT: 100 INJECTION, SOLUTION INTRAVENOUS; SUBCUTANEOUS at 07:30

## 2020-06-30 RX ADMIN — OXYCODONE HYDROCHLORIDE AND ACETAMINOPHEN SCH MG: 500 TABLET ORAL at 15:59

## 2020-06-30 RX ADMIN — SODIUM CHLORIDE SCH MG: 900 INJECTION INTRAVENOUS at 08:46

## 2020-06-30 RX ADMIN — CEFEPIME HYDROCHLORIDE SCH MLS/HR: 1 INJECTION, POWDER, FOR SOLUTION INTRAMUSCULAR; INTRAVENOUS at 12:57

## 2020-06-30 RX ADMIN — INSULIN LISPRO SCH UNIT: 100 INJECTION, SOLUTION INTRAVENOUS; SUBCUTANEOUS at 21:00

## 2020-06-30 NOTE — PROGRESS NOTE
DATE:    

 

SUBJECTIVE:  The patient is seen and evaluated.  Discussed with the daughter in details.

 Available labs and notes reviewed.  Discussed with the nurse. 

 

REVIEW OF SYSTEMS:

Unable to obtain review of systems from patient secondary to his medical condition.

 

MEDICATIONS:  Medication list is reviewed.  From ID point of view, the patient is on

vancomycin IV and cefepime. 

 

LABORATORY STUDIES:  White count of 14.18, hemoglobin 7.5, and platelet 228.  Serology:

Coronavirus PCR 19 not detected on 06/29/2020.  Fecal occult blood is positive. 

 

MICROBIOLOGY:  Blood culture negative 24 hours.

 

RADIOLOGY STUDIES:  Status post PICC line placement.  CT of the head showed no acute

intracranial abnormalities on 06/29.  Chest x-ray showed no focal pneumonia or pulmonary

edema. 

 

PHYSICAL EXAMINATION:

VITAL SIGNS:  Temperature 98.8, pulse 71, respirations 10, blood pressure 140/56, and

maximum temperature on this admission is 99.8. 

GENERAL:  Comfortable in bed, opens eyes, seems lethargic.  No obvious acute distress.

Daughter is in the room.  She states that patient is overall much better. 

CV:  S1-S2. 

CHEST:  Equal expansion.  Clear to auscultation.  No acute distress. 

ABDOMEN:  Soft.  No distention.  No tenderness. 

HEENT:  Moist.  No pallor.  No JVD. 

EXTREMITIES:  No edema of extremities, moves all.

ASSESSMENT AND PLAN:  

1. Altered mental status.

2. Severe debility.

3. Severe anemia. 

 

Continue with antibiotics.  Monitor the patient clinically, follow with the labs.  GI on

the case.  GI scope is planned when patient is medically more stable.  Please refer to

chart for more information.  Follow up with vancomycin trough. 

 

 

Dictated by Yaya Guy PA-C (Al)

 

______________________________

Johan Lopez MD

 

AS/MODL

D:  06/30/2020 11:45:12

T:  06/30/2020 12:18:55

Job #:  [id number]

## 2020-06-30 NOTE — NUR
PT GIVEN SELF PAY PACKET, IF APPROVED FOR HOSPITAL MEDICAID IT WILL ONLY PAY FOR STAY 
INHOUSE, IF PROCESSED THEN WILL BE IN BAR NOTES OR TIESHA APPLICATION GIVEN BY [facility]. PT 
WILL HAVE TO PAY OUT OF POCKET FOR O2 CASH PRICE.

## 2020-06-30 NOTE — NUR
CALLED AND NOTIFIED DR TENORIO OF CONSULT TO CLEAR PT FOR EGD/COLONOSCOPY PROCEDURES DUE TO 
EXTREME SLEEP APNEA EPISODES WITNESSED BY NURSES, NP FOR DR PACE AND PT DAUGHTER TOM. 
DR TENORIO STATES HE WILL COME SEE PT TODAY.

## 2020-06-30 NOTE — NUR
CALLED [facility] TO CHECK ON ETA FOR PT STAT PICC TO BE INSERTED AND WAS TOLD THERE 
WAS ONE STOP BEFORE MR [place] AND THEN THEY WOULD BE COMING. AT THIS TIME PT HAS ONE IV 
STILL ALLOWING NS FLUIDS AND SANDOSTATIN DRIP BUT WILL NEED ANOTHER ACCESS FOR PROBABLE NEXT 
UNITS OF BLOOD AND NO OTHER HOSPITAL STAFF HAVE BEEN ABLE TO GAIN ANOTHER ACCESS AT THIS 
TIME. PT STABLE AND RESTING PEACEFULLY WITH V/S IN NORMAL RANGE. WILL CONT TO MONITOR.

## 2020-06-30 NOTE — NUR
PER DR DAWKINS RECHECK CBC AT 0100 AND IF PT HGB IS 7 OR BELOW THEN TRANSFUSE ONE UNIT PRBC. 
WILL RECHECK AND MONITOR.

## 2020-06-30 NOTE — DIAGNOSTIC IMAGING REPORT
EXAMINATION:  CHEST XRAY LINE PLACEMENT    



INDICATION:      

^PICC LINE PLACEMENT



COMPARISON:  None

     

FINDINGS:  AP view   



TUBES and LINES:  A left upper extremity PICC terminates in the lower SVC.



LUNGS:  Lungs are well inflated.  Lungs are clear. There is no evidence of

pneumonia or pulmonary edema.



PLEURA:  No pleural effusion or pneumothorax.



HEART AND MEDIASTINUM:  The cardiomediastinal silhouette is unremarkable.    



BONES AND SOFT TISSUES:  No acute osseous lesion.  Soft tissues are

unremarkable.



UPPER ABDOMEN: No free air under the diaphragm.    



IMPRESSION: 

The left upper extremity PICC terminates in the lower superior vena cava.





Signed by: Moises Selby MD on 6/30/2020 4:14 AM

## 2020-06-30 NOTE — CONSULTATION
DATE OF CONSULTATION:  

 

Pulmonary Critical Care Consultation 

 

CHIEF COMPLAINT:  Desaturations at night and confusion.

 

HISTORY OF PRESENT ILLNESS:  The patient is a 59-year-old man with minimal past medical

history.  He has some type of diabetes, but does not use inhalers at home.  He is not on

oxygen at home. 

 

He came to the hospital two days ago with increased confusion.  He had some vomiting and

some black stools.  He was admitted to the hospital with a hemoglobin of 4.6.  He

required packed red blood cells.  He is potentially going for an EGD tomorrow. 

 

PAST SURGICAL HISTORY:  None.

 

PAST MEDICAL HISTORY:  

1. Diabetes.

2. The patient denies any prior asthma or respiratory problems.

3. The patient denies any cardiac problems.

 

SOCIAL HISTORY:  The patient was a former smoker.  He was a former drinker.  He works at

[employer].  He is here with his family. 

 

FAMILY HISTORY:  Unknown.

 

ALLERGIES:  NO DRUG ALLERGIES.

 

REVIEW OF SYSTEMS:

There is no history of fevers.  He is having some confusion.  There is no headache.  He

has some snoring.  There is no chest pain.  He has no dyspnea on exertion.  He does not

have cough.  There is no abdominal pain.  He did have some vomiting.  He has some black

stools.  He is not having any leg edema. 

 

PHYSICAL EXAMINATION:

VITAL SIGNS:  The blood pressure is 140/56 and saturation is now 100% on a nasal cannula

4 L.  His pulse is 76.  His respiratory rate is normal. 

HEENT:  Shows no facial swelling or erythema. 

CARDIAC:  Reveals regular rate and rhythm with normal S1 and S2. 

LUNGS:  Auscultation of lungs reveals a prolonged expiratory phase.  There is no

wheezing. 

ABDOMEN:  Soft and nontender.  There is no rebound or guarding. 

EXTREMITIES:  Shows no leg edema or calf tenderness.  There is no cyanosis or clubbing. 

SKIN:  Shows no rashes. 

NEUROLOGICAL:  Shows no focal abnormalities.  The patient does have some confusion, but

it seems to be improving. 

LABORATORY DATA:  Hemoglobin is now 7.5.  White blood cell count is 14.1 and the

platelet count is 228.  The BUN to creatinine ratio is 45 to 0.97.  The albumin is 3.3.

The other electrolytes are within normal limits. 

 

RADIOGRAPHIC DATA:  Chest x-ray shows no acute abnormalities.  The lungs are

hyperinflated. 

 

IMPRESSION:  

1. Anemia secondary to acute blood loss.

2. Upper gastrointestinal bleed.

3. Metabolic encephalopathy.

4. Acute kidney injury.

5. Diabetes.

6. Chronic obstructive pulmonary disease.

 

PLAN:  

1. Continue to monitor hemoglobin.  Give packed red cells as needed.

2. Continue current antibiotics.

3. Oxygen as needed.

4. Bronchodilators as needed.

 

 

 

 

______________________________

MD WENDIE Griffith/MODL

D:  06/30/2020 13:04:47

T:  06/30/2020 19:14:07

Job #:  [id number]

## 2020-06-30 NOTE — NUR
NURSE STAFF STILL ATTEMPTING TO GET LABS FOR PT TO RECHECK H&H AMONG OTHERS AND PATIENT IS 
RESISTING AND PULLING  AS WELL AS THRASHING AROUND IN BED WITH FEET. NURSE ADMINISTERED DOSE 
OF ORDERED ATIVAN IV AND PATIENT STILL RESISTING EVEN WITH 3 NURSES AND DAUGHTER HOLDING 
PATIENT. PICC LINE NURSE ON THE WAY PER [facility] SO CALLED TO NOTIFY NP EPI JORDAN 
AND ORDERED TO GIVE ONE TIME DOSE OF ATIVAN FOR SEDATION SINCE PT ON BIPAP IN ORDER TO GET 
PICC LINE INSTALLED WITHOUT PT BREAKING STERILE PROCEDURE WHILE IT'S PLACED.

## 2020-06-30 NOTE — NUR
NURSING STAFF STILL UNABLE TO OBTAIN BLOOD SAMPLE FROM PATIENT TO RECHECK H&H, MULTIPLE 
ATTEMPTS BY DIFFERENT NURSES UNSUCCESSFUL AND ER STAFF TOO BUSY TO HELP, CALLED [facility] FOR PICC LINE BACK TO GET ETA FOR PICC INSTALL AND AWAITING CALL BACK.

## 2020-07-01 VITALS — SYSTOLIC BLOOD PRESSURE: 165 MMHG | DIASTOLIC BLOOD PRESSURE: 58 MMHG

## 2020-07-01 VITALS — DIASTOLIC BLOOD PRESSURE: 121 MMHG | SYSTOLIC BLOOD PRESSURE: 168 MMHG

## 2020-07-01 VITALS — SYSTOLIC BLOOD PRESSURE: 159 MMHG | DIASTOLIC BLOOD PRESSURE: 88 MMHG

## 2020-07-01 VITALS — SYSTOLIC BLOOD PRESSURE: 165 MMHG | DIASTOLIC BLOOD PRESSURE: 59 MMHG

## 2020-07-01 VITALS — DIASTOLIC BLOOD PRESSURE: 85 MMHG | SYSTOLIC BLOOD PRESSURE: 124 MMHG

## 2020-07-01 VITALS — DIASTOLIC BLOOD PRESSURE: 63 MMHG | SYSTOLIC BLOOD PRESSURE: 158 MMHG

## 2020-07-01 VITALS — SYSTOLIC BLOOD PRESSURE: 161 MMHG | DIASTOLIC BLOOD PRESSURE: 65 MMHG

## 2020-07-01 VITALS — DIASTOLIC BLOOD PRESSURE: 64 MMHG | SYSTOLIC BLOOD PRESSURE: 161 MMHG

## 2020-07-01 LAB
ALBUMIN SERPL-MCNC: 3.2 G/DL (ref 3.5–5)
ALBUMIN/GLOB SERPL: 1.1 {RATIO} (ref 0.8–2)
ALP SERPL-CCNC: 55 IU/L (ref 40–150)
ALT SERPL-CCNC: 21 IU/L (ref 0–55)
ANION GAP SERPL CALC-SCNC: 9.8 MMOL/L (ref 8–16)
BACTERIA URNS QL MICRO: (no result) /HPF
BASOPHILS # BLD AUTO: 0 10*3/UL (ref 0–0.1)
BASOPHILS # BLD AUTO: 0.1 10*3/UL (ref 0–0.1)
BASOPHILS NFR BLD AUTO: 0.3 % (ref 0–1)
BASOPHILS NFR BLD AUTO: 0.4 % (ref 0–1)
BASOPHILS NFR BLD AUTO: 0.5 % (ref 0–1)
BASOPHILS NFR BLD AUTO: 0.6 % (ref 0–1)
BILIRUB UR QL: NEGATIVE
BUN SERPL-MCNC: 20 MG/DL (ref 7–26)
BUN/CREAT SERPL: 25 (ref 6–25)
CALCIUM SERPL-MCNC: 8.5 MG/DL (ref 8.4–10.2)
CHLORIDE SERPL-SCNC: 113 MMOL/L (ref 98–107)
CLARITY UR: CLEAR
CO2 SERPL-SCNC: 25 MMOL/L (ref 22–29)
COLOR UR: YELLOW
DEPRECATED NEUTROPHILS # BLD AUTO: 9 10*3/UL (ref 2.1–6.9)
DEPRECATED NEUTROPHILS # BLD AUTO: 9.1 10*3/UL (ref 2.1–6.9)
DEPRECATED NEUTROPHILS # BLD AUTO: 9.6 10*3/UL (ref 2.1–6.9)
DEPRECATED NEUTROPHILS # BLD AUTO: 9.7 10*3/UL (ref 2.1–6.9)
DEPRECATED RBC URNS MANUAL-ACNC: (no result) /HPF (ref 0–5)
EGFRCR SERPLBLD CKD-EPI 2021: > 60 ML/MIN (ref 60–?)
EOSINOPHIL # BLD AUTO: 0 10*3/UL (ref 0–0.4)
EOSINOPHIL # BLD AUTO: 0 10*3/UL (ref 0–0.4)
EOSINOPHIL # BLD AUTO: 0.1 10*3/UL (ref 0–0.4)
EOSINOPHIL # BLD AUTO: 0.1 10*3/UL (ref 0–0.4)
EOSINOPHIL NFR BLD AUTO: 0.2 % (ref 0–6)
EOSINOPHIL NFR BLD AUTO: 0.3 % (ref 0–6)
EOSINOPHIL NFR BLD AUTO: 0.4 % (ref 0–6)
EOSINOPHIL NFR BLD AUTO: 0.5 % (ref 0–6)
EPI CELLS URNS QL MICRO: (no result) /LPF
ERYTHROCYTE [DISTWIDTH] IN CORD BLOOD: 14.9 % (ref 11.7–14.4)
ERYTHROCYTE [DISTWIDTH] IN CORD BLOOD: 15 % (ref 11.7–14.4)
ERYTHROCYTE [DISTWIDTH] IN CORD BLOOD: 15.1 % (ref 11.7–14.4)
ERYTHROCYTE [DISTWIDTH] IN CORD BLOOD: 15.2 % (ref 11.7–14.4)
FERRITIN SERPL-MCNC: 87.39 NG/ML (ref 21.81–274.66)
GLOBULIN PLAS-MCNC: 2.8 G/DL (ref 2.3–3.5)
GLUCOSE SERPLBLD-MCNC: 174 MG/DL (ref 74–118)
HCT VFR BLD AUTO: 21.6 % (ref 38.2–49.6)
HCT VFR BLD AUTO: 22 % (ref 38.2–49.6)
HCT VFR BLD AUTO: 22.6 % (ref 38.2–49.6)
HCT VFR BLD AUTO: 23 % (ref 38.2–49.6)
HGB BLD-MCNC: 7.1 G/DL (ref 14–18)
HGB BLD-MCNC: 7.1 G/DL (ref 14–18)
HGB BLD-MCNC: 7.3 G/DL (ref 14–18)
HGB BLD-MCNC: 7.6 G/DL (ref 14–18)
IRON SATN MFR SERPL: 10 % (ref 15–50)
IRON SERPL-MCNC: 30 UG/DL (ref 65–175)
KETONES UR QL STRIP.AUTO: (no result)
LEUKOCYTE ESTERASE UR QL STRIP.AUTO: NEGATIVE
LYMPHOCYTES # BLD: 1.3 10*3/UL (ref 1–3.2)
LYMPHOCYTES # BLD: 1.7 10*3/UL (ref 1–3.2)
LYMPHOCYTES # BLD: 2.1 10*3/UL (ref 1–3.2)
LYMPHOCYTES # BLD: 3.7 10*3/UL (ref 1–3.2)
LYMPHOCYTES NFR BLD AUTO: 11 % (ref 18–39.1)
LYMPHOCYTES NFR BLD AUTO: 13.3 % (ref 18–39.1)
LYMPHOCYTES NFR BLD AUTO: 16.9 % (ref 18–39.1)
LYMPHOCYTES NFR BLD AUTO: 25.9 % (ref 18–39.1)
MCH RBC QN AUTO: 28.1 PG (ref 28–32)
MCH RBC QN AUTO: 28.3 PG (ref 28–32)
MCH RBC QN AUTO: 28.6 PG (ref 28–32)
MCH RBC QN AUTO: 28.6 PG (ref 28–32)
MCHC RBC AUTO-ENTMCNC: 32.3 G/DL (ref 31–35)
MCHC RBC AUTO-ENTMCNC: 32.3 G/DL (ref 31–35)
MCHC RBC AUTO-ENTMCNC: 32.9 G/DL (ref 31–35)
MCHC RBC AUTO-ENTMCNC: 33 G/DL (ref 31–35)
MCV RBC AUTO: 86.5 FL (ref 81–99)
MCV RBC AUTO: 87 FL (ref 81–99)
MCV RBC AUTO: 87.1 FL (ref 81–99)
MCV RBC AUTO: 87.6 FL (ref 81–99)
MONOCYTES # BLD AUTO: 0.9 10*3/UL (ref 0.2–0.8)
MONOCYTES # BLD AUTO: 1 10*3/UL (ref 0.2–0.8)
MONOCYTES # BLD AUTO: 1.1 10*3/UL (ref 0.2–0.8)
MONOCYTES # BLD AUTO: 1.2 10*3/UL (ref 0.2–0.8)
MONOCYTES NFR BLD AUTO: 7.7 % (ref 4.4–11.3)
MONOCYTES NFR BLD AUTO: 7.9 % (ref 4.4–11.3)
MONOCYTES NFR BLD AUTO: 8.4 % (ref 4.4–11.3)
MONOCYTES NFR BLD AUTO: 8.8 % (ref 4.4–11.3)
NEUTS SEG NFR BLD AUTO: 63.7 % (ref 38.7–80)
NEUTS SEG NFR BLD AUTO: 72.1 % (ref 38.7–80)
NEUTS SEG NFR BLD AUTO: 77 % (ref 38.7–80)
NEUTS SEG NFR BLD AUTO: 80.2 % (ref 38.7–80)
NITRITE UR QL STRIP.AUTO: NEGATIVE
PLATELET # BLD AUTO: 247 X10E3/UL (ref 140–360)
PLATELET # BLD AUTO: 251 X10E3/UL (ref 140–360)
PLATELET # BLD AUTO: 252 X10E3/UL (ref 140–360)
PLATELET # BLD AUTO: 261 X10E3/UL (ref 140–360)
POTASSIUM SERPL-SCNC: 3.8 MMOL/L (ref 3.5–5.1)
PROT UR QL STRIP.AUTO: NEGATIVE
RBC # BLD AUTO: 2.48 X10E6/UL (ref 4.3–5.7)
RBC # BLD AUTO: 2.53 X10E6/UL (ref 4.3–5.7)
RBC # BLD AUTO: 2.58 X10E6/UL (ref 4.3–5.7)
RBC # BLD AUTO: 2.66 X10E6/UL (ref 4.3–5.7)
SODIUM SERPL-SCNC: 144 MMOL/L (ref 136–145)
SP GR UR STRIP: 1.02 (ref 1.01–1.02)
TIBC SERPL-MCNC: 307 UG/DL (ref 261–478)
TRANSFERRIN SERPL-MCNC: 219 MG/DL (ref 174–364)
UROBILINOGEN UR STRIP-MCNC: 0.2 MG/DL (ref 0.2–1)
WBC #/AREA URNS HPF: (no result) /HPF (ref 0–5)

## 2020-07-01 RX ADMIN — Medication SCH MG: at 17:00

## 2020-07-01 RX ADMIN — CEFEPIME HYDROCHLORIDE SCH MLS/HR: 1 INJECTION, POWDER, FOR SOLUTION INTRAMUSCULAR; INTRAVENOUS at 14:01

## 2020-07-01 RX ADMIN — VANCOMYCIN HYDROCHLORIDE SCH MLS/HR: 500 INJECTION, SOLUTION INTRAVENOUS at 15:00

## 2020-07-01 RX ADMIN — SODIUM CHLORIDE SCH MG: 900 INJECTION INTRAVENOUS at 17:22

## 2020-07-01 RX ADMIN — INSULIN LISPRO SCH UNIT: 100 INJECTION, SOLUTION INTRAVENOUS; SUBCUTANEOUS at 20:46

## 2020-07-01 RX ADMIN — Medication SCH MG: at 08:40

## 2020-07-01 RX ADMIN — OXYCODONE HYDROCHLORIDE AND ACETAMINOPHEN SCH MG: 500 TABLET ORAL at 08:40

## 2020-07-01 RX ADMIN — Medication SCH MLS/HR: at 06:41

## 2020-07-01 RX ADMIN — SODIUM CHLORIDE SCH MLS/HR: 9 INJECTION, SOLUTION INTRAVENOUS at 12:46

## 2020-07-01 RX ADMIN — LORAZEPAM PRN MG: 2 INJECTION INTRAMUSCULAR; INTRAVENOUS at 00:15

## 2020-07-01 RX ADMIN — OXYCODONE HYDROCHLORIDE AND ACETAMINOPHEN SCH MG: 500 TABLET ORAL at 17:00

## 2020-07-01 RX ADMIN — VANCOMYCIN HYDROCHLORIDE SCH MLS/HR: 500 INJECTION, SOLUTION INTRAVENOUS at 03:02

## 2020-07-01 RX ADMIN — HYDRALAZINE HYDROCHLORIDE PRN MG: 20 INJECTION INTRAMUSCULAR; INTRAVENOUS at 17:57

## 2020-07-01 RX ADMIN — SODIUM CHLORIDE SCH MLS/HR: 9 INJECTION, SOLUTION INTRAVENOUS at 00:42

## 2020-07-01 RX ADMIN — INSULIN LISPRO SCH UNIT: 100 INJECTION, SOLUTION INTRAVENOUS; SUBCUTANEOUS at 16:30

## 2020-07-01 RX ADMIN — BACITRACIN SCH GM: 500 OINTMENT TOPICAL at 08:40

## 2020-07-01 RX ADMIN — INSULIN LISPRO SCH UNIT: 100 INJECTION, SOLUTION INTRAVENOUS; SUBCUTANEOUS at 11:30

## 2020-07-01 RX ADMIN — SODIUM CHLORIDE SCH MLS/HR: 9 INJECTION, SOLUTION INTRAVENOUS at 14:26

## 2020-07-01 RX ADMIN — LORAZEPAM PRN MG: 2 INJECTION INTRAMUSCULAR; INTRAVENOUS at 19:20

## 2020-07-01 RX ADMIN — INSULIN LISPRO SCH UNIT: 100 INJECTION, SOLUTION INTRAVENOUS; SUBCUTANEOUS at 07:30

## 2020-07-01 RX ADMIN — SODIUM CHLORIDE SCH MG: 900 INJECTION INTRAVENOUS at 08:40

## 2020-07-01 NOTE — NUR
0010 Daughter called from room. Noted patient agitated and attempting to remove gown, tele, 
pulse ox and pulling against restraints. 

0015 Medicated PRN, patient continue to be agitated, attempting to kick and removing top 
linen and gown. Daughter stating patient stating he want to leave. Reoriented patient to 
place and time. Patient remains agitated. Continue to redirect and distract with daughter at 
bedside. Will continue to monitor.

## 2020-07-01 NOTE — DIAGNOSTIC IMAGING REPORT
EXAM: CT Chest WITHOUT contrast 7/1/2020 4:50 PM

INDICATION: Pneumonia. 

COMPARISON: None

TECHNIQUE:

Chest was scanned utilizing a multidetector helical scanner from the lung apex

through the level of the adrenal glands without administration of IV contrast.

Absence of intravenous contrast decreases sensitivity for detection of

lymphadenopathy and vascular pathology. Coronal and sagittal reformations were

obtained. Routine protocol was performed.

           IV CONTRAST: None

           RADIATION DOSE: Total DLP: 480.29 mGy*cm

            Estimated effective dose: (DLP x 0.014 x size factor) mSv

           COMPLICATIONS: None



FINDINGS:



LINES/ TUBES: Left upper extremity PICC with distal tip within the cavoatrial

junction.



LUNGS AND AIRWAYS:  Bilateral multi focal patchy groundglass densities and

nodular consolidation scattered throughout predominantly the dependent portions

of the lower lobes and superior segment of the upper lobes. Airways are normal.



PLEURA: Bilateral small pleural effusions. No pneumothorax.



HEART AND MEDIASTINUM: The thyroid gland is normal.  No mediastinal, hilar or

axillary lymphadenopathy.  The heart is normal in size.. There is no

pericardial effusion.  There are mild to moderate atherosclerotic

calcifications in the aorta and coronary arteries.



UPPER ABDOMEN: Limited non-contrast views of the upper abdomen demonstrate mild

high attenuation within the gallbladder lumen, likely sludge.. The adrenal

glands are normal.



BONES: There are degenerative changes in the thoracic spine. Multilevel

vertebral hemangiomata.



SOFT TISSUES: Unremarkable.



IMPRESSION: 

Findings consistent with bilateral multifocal pneumonia, possibly aspiration in

the proper clinical setting. Bilateral small pleural effusions.



Signed by: Dr. LOBO Prakash M.D. on 7/1/2020 6:40 PM

## 2020-07-01 NOTE — NUR
Dr SHALONDA Bond ordered to discontinue Sandostatin drip and start Carafate PO as soon as patient 
no longer NPO, orders carried out.

## 2020-07-02 VITALS — DIASTOLIC BLOOD PRESSURE: 71 MMHG | SYSTOLIC BLOOD PRESSURE: 112 MMHG

## 2020-07-02 VITALS — SYSTOLIC BLOOD PRESSURE: 104 MMHG | DIASTOLIC BLOOD PRESSURE: 90 MMHG

## 2020-07-02 VITALS — DIASTOLIC BLOOD PRESSURE: 79 MMHG | SYSTOLIC BLOOD PRESSURE: 139 MMHG

## 2020-07-02 VITALS — DIASTOLIC BLOOD PRESSURE: 70 MMHG | SYSTOLIC BLOOD PRESSURE: 169 MMHG

## 2020-07-02 VITALS — SYSTOLIC BLOOD PRESSURE: 112 MMHG | DIASTOLIC BLOOD PRESSURE: 71 MMHG

## 2020-07-02 VITALS — DIASTOLIC BLOOD PRESSURE: 90 MMHG | SYSTOLIC BLOOD PRESSURE: 104 MMHG

## 2020-07-02 VITALS — DIASTOLIC BLOOD PRESSURE: 80 MMHG | SYSTOLIC BLOOD PRESSURE: 169 MMHG

## 2020-07-02 VITALS — SYSTOLIC BLOOD PRESSURE: 172 MMHG | DIASTOLIC BLOOD PRESSURE: 73 MMHG

## 2020-07-02 VITALS — SYSTOLIC BLOOD PRESSURE: 110 MMHG | DIASTOLIC BLOOD PRESSURE: 51 MMHG

## 2020-07-02 VITALS — DIASTOLIC BLOOD PRESSURE: 72 MMHG | SYSTOLIC BLOOD PRESSURE: 127 MMHG

## 2020-07-02 LAB
ALBUMIN SERPL-MCNC: 2.8 G/DL (ref 3.5–5)
ALBUMIN/GLOB SERPL: 1 {RATIO} (ref 0.8–2)
ALP SERPL-CCNC: 62 IU/L (ref 40–150)
ALT SERPL-CCNC: 19 IU/L (ref 0–55)
ANION GAP SERPL CALC-SCNC: 13.3 MMOL/L (ref 8–16)
BASOPHILS # BLD AUTO: 0.1 10*3/UL (ref 0–0.1)
BASOPHILS NFR BLD AUTO: 0.3 % (ref 0–1)
BUN SERPL-MCNC: 12 MG/DL (ref 7–26)
BUN/CREAT SERPL: 14 (ref 6–25)
CALCIUM SERPL-MCNC: 8.2 MG/DL (ref 8.4–10.2)
CHLORIDE SERPL-SCNC: 114 MMOL/L (ref 98–107)
CO2 SERPL-SCNC: 24 MMOL/L (ref 22–29)
DEPRECATED NEUTROPHILS # BLD AUTO: 12.7 10*3/UL (ref 2.1–6.9)
EGFRCR SERPLBLD CKD-EPI 2021: > 60 ML/MIN (ref 60–?)
EOSINOPHIL # BLD AUTO: 0 10*3/UL (ref 0–0.4)
EOSINOPHIL NFR BLD AUTO: 0 % (ref 0–6)
ERYTHROCYTE [DISTWIDTH] IN CORD BLOOD: 15.5 % (ref 11.7–14.4)
GLOBULIN PLAS-MCNC: 2.9 G/DL (ref 2.3–3.5)
GLUCOSE SERPLBLD-MCNC: 178 MG/DL (ref 74–118)
HCT VFR BLD AUTO: 23.1 % (ref 38.2–49.6)
HGB BLD-MCNC: 7.5 G/DL (ref 14–18)
LYMPHOCYTES # BLD: 1.1 10*3/UL (ref 1–3.2)
LYMPHOCYTES NFR BLD AUTO: 7 % (ref 18–39.1)
MAGNESIUM SERPL-MCNC: 1.7 MG/DL (ref 1.3–2.1)
MCH RBC QN AUTO: 28.4 PG (ref 28–32)
MCHC RBC AUTO-ENTMCNC: 32.5 G/DL (ref 31–35)
MCV RBC AUTO: 87.5 FL (ref 81–99)
MONOCYTES # BLD AUTO: 1.1 10*3/UL (ref 0.2–0.8)
MONOCYTES NFR BLD AUTO: 7 % (ref 4.4–11.3)
NEUTS SEG NFR BLD AUTO: 84.8 % (ref 38.7–80)
PLATELET # BLD AUTO: 280 X10E3/UL (ref 140–360)
POTASSIUM SERPL-SCNC: 3.3 MMOL/L (ref 3.5–5.1)
RBC # BLD AUTO: 2.64 X10E6/UL (ref 4.3–5.7)
SODIUM SERPL-SCNC: 148 MMOL/L (ref 136–145)

## 2020-07-02 RX ADMIN — Medication SCH MG: at 08:05

## 2020-07-02 RX ADMIN — METOPROLOL TARTRATE PRN MG: 1 INJECTION, SOLUTION INTRAVENOUS at 08:00

## 2020-07-02 RX ADMIN — BACITRACIN SCH GM: 500 OINTMENT TOPICAL at 08:05

## 2020-07-02 RX ADMIN — METOPROLOL TARTRATE PRN MG: 1 INJECTION, SOLUTION INTRAVENOUS at 05:45

## 2020-07-02 RX ADMIN — LORAZEPAM PRN MG: 2 INJECTION INTRAMUSCULAR; INTRAVENOUS at 07:59

## 2020-07-02 RX ADMIN — INSULIN LISPRO SCH UNIT: 100 INJECTION, SOLUTION INTRAVENOUS; SUBCUTANEOUS at 16:30

## 2020-07-02 RX ADMIN — Medication SCH MG: at 16:01

## 2020-07-02 RX ADMIN — METRONIDAZOLE SCH MLS/HR: 500 INJECTION, SOLUTION INTRAVENOUS at 21:19

## 2020-07-02 RX ADMIN — LORAZEPAM PRN MG: 2 INJECTION INTRAMUSCULAR; INTRAVENOUS at 23:00

## 2020-07-02 RX ADMIN — Medication SCH MG: at 16:00

## 2020-07-02 RX ADMIN — VANCOMYCIN HYDROCHLORIDE SCH MLS/HR: 500 INJECTION, SOLUTION INTRAVENOUS at 15:01

## 2020-07-02 RX ADMIN — METRONIDAZOLE SCH MLS/HR: 500 INJECTION, SOLUTION INTRAVENOUS at 14:08

## 2020-07-02 RX ADMIN — OXYCODONE HYDROCHLORIDE AND ACETAMINOPHEN SCH MG: 500 TABLET ORAL at 08:05

## 2020-07-02 RX ADMIN — VANCOMYCIN HYDROCHLORIDE SCH MLS/HR: 500 INJECTION, SOLUTION INTRAVENOUS at 03:30

## 2020-07-02 RX ADMIN — LORAZEPAM PRN MG: 2 INJECTION INTRAMUSCULAR; INTRAVENOUS at 14:15

## 2020-07-02 RX ADMIN — INSULIN LISPRO SCH UNIT: 100 INJECTION, SOLUTION INTRAVENOUS; SUBCUTANEOUS at 12:02

## 2020-07-02 RX ADMIN — SODIUM CHLORIDE SCH MG: 900 INJECTION INTRAVENOUS at 17:34

## 2020-07-02 RX ADMIN — CEFEPIME HYDROCHLORIDE SCH MLS/HR: 1 INJECTION, POWDER, FOR SOLUTION INTRAMUSCULAR; INTRAVENOUS at 13:38

## 2020-07-02 RX ADMIN — SODIUM CHLORIDE SCH MLS/HR: 9 INJECTION, SOLUTION INTRAVENOUS at 11:41

## 2020-07-02 RX ADMIN — SUCRALFATE SCH GM: 1 TABLET ORAL at 15:01

## 2020-07-02 RX ADMIN — DEXTROSE AND SODIUM CHLORIDE SCH MLS/HR: 5; 450 INJECTION, SOLUTION INTRAVENOUS at 11:41

## 2020-07-02 RX ADMIN — SODIUM CHLORIDE SCH MG: 900 INJECTION INTRAVENOUS at 07:59

## 2020-07-02 RX ADMIN — SUCRALFATE SCH GM: 1 TABLET ORAL at 21:00

## 2020-07-02 RX ADMIN — INSULIN LISPRO SCH UNIT: 100 INJECTION, SOLUTION INTRAVENOUS; SUBCUTANEOUS at 21:00

## 2020-07-02 RX ADMIN — SUCRALFATE SCH GM: 1 TABLET ORAL at 11:23

## 2020-07-02 RX ADMIN — ZINC OXIDE SCH GM: 200 OINTMENT TOPICAL at 08:05

## 2020-07-02 RX ADMIN — OXYCODONE HYDROCHLORIDE AND ACETAMINOPHEN SCH MG: 500 TABLET ORAL at 16:00

## 2020-07-02 RX ADMIN — INSULIN LISPRO SCH UNIT: 100 INJECTION, SOLUTION INTRAVENOUS; SUBCUTANEOUS at 07:30

## 2020-07-02 RX ADMIN — SUCRALFATE SCH GM: 1 TABLET ORAL at 07:30

## 2020-07-02 NOTE — NUR
ST NOTE:



Spoke with IRVIN Sevilla this AM, pt not appropriate due to restlessness and AMS.  Checked pt 
status at 12:30, no change, will follow up bf end of day for readiness.  If pt not 
appropriate, he will need NG tube for nutrition/hydration.  Pt is restrained at this time.

## 2020-07-02 NOTE — PROGRESS NOTE
DATE:    

 

SUBJECTIVE:  The patient is seen and evaluated. Discussed with the attending team.

Discussed with the patient's daughter in the room. The patient remains agitated and

confused, not much of interaction with me.  He is currently combative in the bed,

restrained upper extremities.  Unable to obtain review of systems. 

 

PHYSICAL EXAMINATION:

VITAL SIGNS:  Temperature 99.9 at the max today, pulse 137, respirations 19, blood

pressure 104/90. 

GENERAL:  Comfortable in bed, confused, combative, restrained upper extremities. 

CV:  S1 and S2. 

CHEST:  Equal expansion. Decreased breath sounds. No acute distress. 

ABDOMEN:  Soft. No distention. 

HEENT:  Moist.  No pallor.  No JVD. 

EXTREMITIES:  No significant edema.

MEDICATIONS:  Medication list reviewed. The patient is on vancomycin IV, cefepime, also

zinc sulfate and vitamin C. 

 

LABORATORY STUDIES:  White count of 14.99, hemoglobin 7.5, platelet 280.  Sodium 148,

potassium 3.3, creatinine 0.85.  Vancomycin trough 3.5 on 06/30.  Coronavirus PCR not

detected on 06/29. 

 

MICROBIOLOGY:  Blood culture negative 72 hours.

 

RADIOLOGY STUDIES:  CT of the chest from yesterday reviewed.  Findings consistent with

bilateral multifocal pneumonia, possibly aspiration in the proper clinical setting,

bilateral small pleural effusions. 

 

ASSESSMENT AND PLAN:  

1. Altered mental status.

2. Concern pneumonia, aspiration type.

3. Severe anemia.

4. Severe debility. 

The patient on vancomycin IV and cefepime, leukocytosis with T-max of 99.9 with concerns

for aspiration pneumonia. We will add Flagyl.  Recheck CBC tomorrow.  Monitor the

patient clinically. Follow up with the labs.  Discussed with the attending team in

regard to antibiotic plans and also discussed with the daughter.  Discussed with Dr. Lopez in detail. 

 

 

Dictated by Yaya Guy PA-C (Al)

 

______________________________

Johan Lopez MD

 

AS/MODL

D:  07/02/2020 11:33:21

T:  07/02/2020 13:14:30

Job #:  [id number]

## 2020-07-02 NOTE — NUR
received patient agitated and restless. Heart rate elevated 150's A FIB, per telemetry 
patient converted to A fib around 0400, will call Md for further orders.

## 2020-07-02 NOTE — CONSULTATION
DATE OF CONSULTATION:  07/02/2020  

 

REASON FOR CONSULTATION:  AFib with RVR.  GI bleed, altered mental status.

 

HISTORY OF PRESENT ILLNESS:  This is a 59-year-old male with history of 
diabetes.  The

patient presents to [facility] ER with altered mental status and 
weakness

and pale in appearance, was noted to be severely anemic with a hemoglobin of 4, 
and also

reported of coffee-grounds emesis.  The patient underwent EGD, when he was found
to have

gastric ulcers, on appropriate medications per GI.  However, also was noted to 
be

hypoxic, underwent CT scan of the chest showing bilateral pneumonias.  This 
a.m., the

patient was noted to be in AFib with RVR.  Cardiology was consulted to evaluate 
the

patient.  The patient was started on amiodarone drip.  Currently, he is in AFib 
with a

heart rate of 120.  The patient is seen in room with daughter at bedside.  The 
patient

has been sedated due to his confusion, so no information was obtained from the 
patient;

however, daughter reports the patient has history of diabetes, is not on any

medications.  He does have a history of heavy alcohol use and tobacco use, 
however, quit

about two years ago. 

 

PAST MEDICAL HISTORY:  Diabetes.

 

SURGICAL HISTORY:  No surgical history.

 

SOCIAL HISTORY:  He is .  He works at [employer].  He used to drink at 
least one

pack per day of beer, however, quit in 2018.  Also, he was a heavy smoker at one
pack

per day for greater than 20 years, quit, however, in 2018. 

 

FAMILY HISTORY:  No reports of CAD, stroke, cancer.

 

ALLERGIES:  NO KNOWN ALLERGIES.

 

REVIEW OF SYSTEMS:

Unable to obtain due to the patient's confusion and he has been sedated.

 

PHYSICAL EXAMINATION:

VITAL SIGNS:  Height 66 inches, weight 165 pounds.  Current vital signs 
temperature

99.9, pulse 136, respiratory rate 18, blood pressure 104/90, and pulse ox 94% on
2 L

nasal cannula. 

GENERAL:  The patient is sedated, unable to get any information. 

SKIN:  No rashes or bruises noted. 

HEENT:  Normocephalic.  Pupils are equal and reactive.  No JVD.  No carotid 
bruits.

Oral mucosa pink. 

HEART:  Irregular rhythm and tachycardic with soft systolic murmur heard in the 
right

upper sternal border. 

LUNGS:  Bilaterally with rhonchi on nasal cannula. 

ABDOMEN:  Soft, nontender, and nondistended.  No organomegaly noted. 

VASCULAR: +2 radial pulses bilaterally, +1 DP and PT pulses bilaterally. 

NEUROLOGIC:  Unable to obtain.  The patient is sedated.

LABORATORY DATA:  White count 14.9, hemoglobin is 7, hematocrit of 23, and 
platelets of

280.  Sodium 148, potassium 3.3, chloride 114, BUN 12, creatinine 0.8, and 
glucose 178.

Troponins 0.02 x3.  TSH of 0.21.  INR 1.05, PTT 25, and PT 14.  Stool CATHY 
positive.

COVID negative. 

 

IMAGING DATA:  CT of chest done on 07/01/2020, showing bilateral multifocal 
pneumonia

consistent with aspiration.  EKG showing AFib with RVR with heart rate in 130s. 

 

ASSESSMENT:  

1. Gastrointestinal bleed, status post esophagogastroduodenoscopy showing 
gastric ulcers.

2. Aspiration pneumonia.

3. Atrial fibrillation with rapid ventricular response.

4. Metabolic encephalopathy secondary to probable infection.

 

PLAN:  

1. The patient presents to [facility] with altered mental status, 
notable

with coffee-grounds emesis.  Hemoglobin of 4 on admission, status post multiple 
blood

transfusions and EGD showing gastric ulcer.  This a.m., the patient went into 
AFib with

RVR.  We tried rhythm suppression with amiodarone. 

2. We will continue beta-blocker therapy for heart rate control.

3. We will obtain echo to evaluate heart function structure.

4. The patient on PPI management as per GI.

5. Further recommendations as course progresses. 

Thank you very much for this consult.

Agree with note

 

Dictated by Tommy Valero, TOSHA

 

______________________________

Stanley Lira MD DC/KARIS

D:  07/02/2020 11:16:03

T:  07/02/2020 18:29:37

Job #:  [id number]

 

GLORY

## 2020-07-02 NOTE — NUR
Informed Rafael NP, that patient heart rate remains elevated after 2 doses of Metoprolol 10 mg 
IVP given and per telemetry patient  A FIB RVR, orders received and entered

## 2020-07-02 NOTE — NUR
CALLED AND SPOKE WITH DR PACE, PT HEART RATE NOW IS ON 150s to 160s. THE MD ORDERED 
METOPROLOL IV 10 MG IV Q2 HR IF HR GREATER THAN 100. ORDER READ BACK AND CARRIED OUT.

## 2020-07-03 VITALS — DIASTOLIC BLOOD PRESSURE: 72 MMHG | SYSTOLIC BLOOD PRESSURE: 171 MMHG

## 2020-07-03 VITALS — DIASTOLIC BLOOD PRESSURE: 74 MMHG | SYSTOLIC BLOOD PRESSURE: 172 MMHG

## 2020-07-03 VITALS — DIASTOLIC BLOOD PRESSURE: 74 MMHG | SYSTOLIC BLOOD PRESSURE: 178 MMHG

## 2020-07-03 VITALS — DIASTOLIC BLOOD PRESSURE: 77 MMHG | SYSTOLIC BLOOD PRESSURE: 185 MMHG

## 2020-07-03 VITALS — DIASTOLIC BLOOD PRESSURE: 69 MMHG | SYSTOLIC BLOOD PRESSURE: 178 MMHG

## 2020-07-03 VITALS — SYSTOLIC BLOOD PRESSURE: 148 MMHG | DIASTOLIC BLOOD PRESSURE: 52 MMHG

## 2020-07-03 VITALS — SYSTOLIC BLOOD PRESSURE: 172 MMHG | DIASTOLIC BLOOD PRESSURE: 74 MMHG

## 2020-07-03 VITALS — SYSTOLIC BLOOD PRESSURE: 170 MMHG | DIASTOLIC BLOOD PRESSURE: 68 MMHG

## 2020-07-03 VITALS — DIASTOLIC BLOOD PRESSURE: 90 MMHG | SYSTOLIC BLOOD PRESSURE: 170 MMHG

## 2020-07-03 LAB
ALBUMIN SERPL-MCNC: 2.9 G/DL (ref 3.5–5)
ALBUMIN/GLOB SERPL: 0.9 {RATIO} (ref 0.8–2)
ALP SERPL-CCNC: 65 IU/L (ref 40–150)
ALT SERPL-CCNC: 32 IU/L (ref 0–55)
ANION GAP SERPL CALC-SCNC: 13.4 MMOL/L (ref 8–16)
BASOPHILS # BLD AUTO: 0.1 10*3/UL (ref 0–0.1)
BASOPHILS NFR BLD AUTO: 0.3 % (ref 0–1)
BUN SERPL-MCNC: 15 MG/DL (ref 7–26)
BUN/CREAT SERPL: 16 (ref 6–25)
CALCIUM SERPL-MCNC: 8.3 MG/DL (ref 8.4–10.2)
CHLORIDE SERPL-SCNC: 116 MMOL/L (ref 98–107)
CO2 SERPL-SCNC: 24 MMOL/L (ref 22–29)
DEPRECATED NEUTROPHILS # BLD AUTO: 15.9 10*3/UL (ref 2.1–6.9)
EGFRCR SERPLBLD CKD-EPI 2021: > 60 ML/MIN (ref 60–?)
EOSINOPHIL # BLD AUTO: 0 10*3/UL (ref 0–0.4)
EOSINOPHIL NFR BLD AUTO: 0 % (ref 0–6)
ERYTHROCYTE [DISTWIDTH] IN CORD BLOOD: 16 % (ref 11.7–14.4)
GLOBULIN PLAS-MCNC: 3.3 G/DL (ref 2.3–3.5)
GLUCOSE SERPLBLD-MCNC: 209 MG/DL (ref 74–118)
HCT VFR BLD AUTO: 22.9 % (ref 38.2–49.6)
HGB BLD-MCNC: 7.5 G/DL (ref 14–18)
LYMPHOCYTES # BLD: 1.4 10*3/UL (ref 1–3.2)
LYMPHOCYTES NFR BLD AUTO: 7.4 % (ref 18–39.1)
MAGNESIUM SERPL-MCNC: 1.7 MG/DL (ref 1.3–2.1)
MCH RBC QN AUTO: 28.7 PG (ref 28–32)
MCHC RBC AUTO-ENTMCNC: 32.8 G/DL (ref 31–35)
MCV RBC AUTO: 87.7 FL (ref 81–99)
MONOCYTES # BLD AUTO: 1.4 10*3/UL (ref 0.2–0.8)
MONOCYTES NFR BLD AUTO: 7.3 % (ref 4.4–11.3)
NEUTS SEG NFR BLD AUTO: 84.4 % (ref 38.7–80)
PLATELET # BLD AUTO: 312 X10E3/UL (ref 140–360)
POTASSIUM SERPL-SCNC: 3.4 MMOL/L (ref 3.5–5.1)
RBC # BLD AUTO: 2.61 X10E6/UL (ref 4.3–5.7)
SODIUM SERPL-SCNC: 150 MMOL/L (ref 136–145)

## 2020-07-03 RX ADMIN — Medication SCH MG: at 17:00

## 2020-07-03 RX ADMIN — SODIUM CHLORIDE SCH MG: 900 INJECTION INTRAVENOUS at 08:13

## 2020-07-03 RX ADMIN — BACITRACIN SCH GM: 500 OINTMENT TOPICAL at 09:22

## 2020-07-03 RX ADMIN — INSULIN LISPRO SCH UNIT: 100 INJECTION, SOLUTION INTRAVENOUS; SUBCUTANEOUS at 23:33

## 2020-07-03 RX ADMIN — VANCOMYCIN HYDROCHLORIDE SCH MLS/HR: 1 INJECTION, SOLUTION INTRAVENOUS at 15:49

## 2020-07-03 RX ADMIN — SUCRALFATE SCH GM: 1 TABLET ORAL at 11:30

## 2020-07-03 RX ADMIN — OXYCODONE HYDROCHLORIDE AND ACETAMINOPHEN SCH MG: 500 TABLET ORAL at 08:14

## 2020-07-03 RX ADMIN — SUCRALFATE SCH GM: 1 TABLET ORAL at 07:30

## 2020-07-03 RX ADMIN — INSULIN LISPRO SCH UNIT: 100 INJECTION, SOLUTION INTRAVENOUS; SUBCUTANEOUS at 08:13

## 2020-07-03 RX ADMIN — SUCRALFATE SCH GM: 1 TABLET ORAL at 21:25

## 2020-07-03 RX ADMIN — SODIUM CHLORIDE SCH MLS/HR: 9 INJECTION, SOLUTION INTRAVENOUS at 13:25

## 2020-07-03 RX ADMIN — CEFEPIME HYDROCHLORIDE SCH MLS/HR: 1 INJECTION, POWDER, FOR SOLUTION INTRAMUSCULAR; INTRAVENOUS at 13:30

## 2020-07-03 RX ADMIN — Medication SCH MG: at 08:14

## 2020-07-03 RX ADMIN — METOPROLOL TARTRATE PRN MG: 1 INJECTION, SOLUTION INTRAVENOUS at 06:17

## 2020-07-03 RX ADMIN — VANCOMYCIN HYDROCHLORIDE SCH MLS/HR: 500 INJECTION, SOLUTION INTRAVENOUS at 03:00

## 2020-07-03 RX ADMIN — Medication SCH MG: at 08:13

## 2020-07-03 RX ADMIN — SODIUM CHLORIDE SCH MG: 900 INJECTION INTRAVENOUS at 17:39

## 2020-07-03 RX ADMIN — INSULIN LISPRO SCH UNIT: 100 INJECTION, SOLUTION INTRAVENOUS; SUBCUTANEOUS at 12:00

## 2020-07-03 RX ADMIN — METRONIDAZOLE SCH MLS/HR: 500 INJECTION, SOLUTION INTRAVENOUS at 06:16

## 2020-07-03 RX ADMIN — SUCRALFATE SCH GM: 1 TABLET ORAL at 16:30

## 2020-07-03 RX ADMIN — DEXTROSE AND SODIUM CHLORIDE SCH MLS/HR: 5; 450 INJECTION, SOLUTION INTRAVENOUS at 08:13

## 2020-07-03 RX ADMIN — HYDRALAZINE HYDROCHLORIDE PRN MG: 20 INJECTION INTRAMUSCULAR; INTRAVENOUS at 20:07

## 2020-07-03 RX ADMIN — METRONIDAZOLE SCH MLS/HR: 500 INJECTION, SOLUTION INTRAVENOUS at 21:54

## 2020-07-03 RX ADMIN — METRONIDAZOLE SCH MLS/HR: 500 INJECTION, SOLUTION INTRAVENOUS at 14:44

## 2020-07-03 RX ADMIN — INSULIN LISPRO SCH UNIT: 100 INJECTION, SOLUTION INTRAVENOUS; SUBCUTANEOUS at 18:00

## 2020-07-03 RX ADMIN — ZINC OXIDE SCH GM: 200 OINTMENT TOPICAL at 08:27

## 2020-07-03 RX ADMIN — OXYCODONE HYDROCHLORIDE AND ACETAMINOPHEN SCH MG: 500 TABLET ORAL at 17:00

## 2020-07-03 RX ADMIN — LORAZEPAM PRN MG: 2 INJECTION INTRAMUSCULAR; INTRAVENOUS at 23:32

## 2020-07-03 RX ADMIN — LORAZEPAM PRN MG: 2 INJECTION INTRAMUSCULAR; INTRAVENOUS at 05:00

## 2020-07-04 VITALS — SYSTOLIC BLOOD PRESSURE: 170 MMHG | DIASTOLIC BLOOD PRESSURE: 82 MMHG

## 2020-07-04 VITALS — SYSTOLIC BLOOD PRESSURE: 172 MMHG | DIASTOLIC BLOOD PRESSURE: 71 MMHG

## 2020-07-04 VITALS — SYSTOLIC BLOOD PRESSURE: 193 MMHG | DIASTOLIC BLOOD PRESSURE: 78 MMHG

## 2020-07-04 VITALS — SYSTOLIC BLOOD PRESSURE: 169 MMHG | DIASTOLIC BLOOD PRESSURE: 70 MMHG

## 2020-07-04 VITALS — SYSTOLIC BLOOD PRESSURE: 155 MMHG | DIASTOLIC BLOOD PRESSURE: 87 MMHG

## 2020-07-04 VITALS — DIASTOLIC BLOOD PRESSURE: 78 MMHG | SYSTOLIC BLOOD PRESSURE: 124 MMHG

## 2020-07-04 VITALS — DIASTOLIC BLOOD PRESSURE: 82 MMHG | SYSTOLIC BLOOD PRESSURE: 170 MMHG

## 2020-07-04 VITALS — SYSTOLIC BLOOD PRESSURE: 178 MMHG | DIASTOLIC BLOOD PRESSURE: 71 MMHG

## 2020-07-04 VITALS — DIASTOLIC BLOOD PRESSURE: 78 MMHG | SYSTOLIC BLOOD PRESSURE: 173 MMHG

## 2020-07-04 VITALS — DIASTOLIC BLOOD PRESSURE: 71 MMHG | SYSTOLIC BLOOD PRESSURE: 178 MMHG

## 2020-07-04 VITALS — SYSTOLIC BLOOD PRESSURE: 173 MMHG | DIASTOLIC BLOOD PRESSURE: 78 MMHG

## 2020-07-04 LAB
ALBUMIN SERPL-MCNC: 2.6 G/DL (ref 3.5–5)
ALBUMIN/GLOB SERPL: 0.8 {RATIO} (ref 0.8–2)
ALP SERPL-CCNC: 55 IU/L (ref 40–150)
ALT SERPL-CCNC: 35 IU/L (ref 0–55)
ANION GAP SERPL CALC-SCNC: 13.5 MMOL/L (ref 8–16)
BASOPHILS # BLD AUTO: 0.1 10*3/UL (ref 0–0.1)
BASOPHILS NFR BLD AUTO: 0.4 % (ref 0–1)
BUN SERPL-MCNC: 15 MG/DL (ref 7–26)
BUN/CREAT SERPL: 18 (ref 6–25)
CALCIUM SERPL-MCNC: 8.1 MG/DL (ref 8.4–10.2)
CHLORIDE SERPL-SCNC: 114 MMOL/L (ref 98–107)
CO2 SERPL-SCNC: 22 MMOL/L (ref 22–29)
DEPRECATED NEUTROPHILS # BLD AUTO: 14.5 10*3/UL (ref 2.1–6.9)
EGFRCR SERPLBLD CKD-EPI 2021: > 60 ML/MIN (ref 60–?)
EOSINOPHIL # BLD AUTO: 0 10*3/UL (ref 0–0.4)
EOSINOPHIL NFR BLD AUTO: 0.1 % (ref 0–6)
ERYTHROCYTE [DISTWIDTH] IN CORD BLOOD: 16.4 % (ref 11.7–14.4)
GLOBULIN PLAS-MCNC: 3.4 G/DL (ref 2.3–3.5)
GLUCOSE SERPLBLD-MCNC: 174 MG/DL (ref 74–118)
HCT VFR BLD AUTO: 22.9 % (ref 38.2–49.6)
HGB BLD-MCNC: 7.4 G/DL (ref 14–18)
LYMPHOCYTES # BLD: 0.7 10*3/UL (ref 1–3.2)
LYMPHOCYTES NFR BLD AUTO: 4.5 % (ref 18–39.1)
MCH RBC QN AUTO: 28.6 PG (ref 28–32)
MCHC RBC AUTO-ENTMCNC: 32.3 G/DL (ref 31–35)
MCV RBC AUTO: 88.4 FL (ref 81–99)
MONOCYTES # BLD AUTO: 0.9 10*3/UL (ref 0.2–0.8)
MONOCYTES NFR BLD AUTO: 5.7 % (ref 4.4–11.3)
NEUTS SEG NFR BLD AUTO: 88.3 % (ref 38.7–80)
PLATELET # BLD AUTO: 302 X10E3/UL (ref 140–360)
POTASSIUM SERPL-SCNC: 3.5 MMOL/L (ref 3.5–5.1)
RBC # BLD AUTO: 2.59 X10E6/UL (ref 4.3–5.7)
SODIUM SERPL-SCNC: 146 MMOL/L (ref 136–145)

## 2020-07-04 RX ADMIN — OXYCODONE HYDROCHLORIDE AND ACETAMINOPHEN SCH MG: 500 TABLET ORAL at 09:00

## 2020-07-04 RX ADMIN — Medication SCH MG: at 17:00

## 2020-07-04 RX ADMIN — OXYCODONE HYDROCHLORIDE AND ACETAMINOPHEN SCH MG: 500 TABLET ORAL at 17:00

## 2020-07-04 RX ADMIN — ZINC OXIDE SCH GM: 200 OINTMENT TOPICAL at 09:05

## 2020-07-04 RX ADMIN — INSULIN LISPRO SCH UNIT: 100 INJECTION, SOLUTION INTRAVENOUS; SUBCUTANEOUS at 19:00

## 2020-07-04 RX ADMIN — SODIUM CHLORIDE SCH MG: 900 INJECTION INTRAVENOUS at 09:05

## 2020-07-04 RX ADMIN — METRONIDAZOLE SCH MLS/HR: 500 INJECTION, SOLUTION INTRAVENOUS at 14:00

## 2020-07-04 RX ADMIN — SUCRALFATE SCH GM: 1 TABLET ORAL at 22:00

## 2020-07-04 RX ADMIN — INSULIN LISPRO SCH UNIT: 100 INJECTION, SOLUTION INTRAVENOUS; SUBCUTANEOUS at 11:30

## 2020-07-04 RX ADMIN — SUCRALFATE SCH GM: 1 TABLET ORAL at 12:15

## 2020-07-04 RX ADMIN — METRONIDAZOLE SCH MLS/HR: 500 INJECTION, SOLUTION INTRAVENOUS at 22:13

## 2020-07-04 RX ADMIN — METRONIDAZOLE SCH MLS/HR: 500 INJECTION, SOLUTION INTRAVENOUS at 06:17

## 2020-07-04 RX ADMIN — Medication SCH MG: at 09:00

## 2020-07-04 RX ADMIN — VANCOMYCIN HYDROCHLORIDE SCH MLS/HR: 1 INJECTION, SOLUTION INTRAVENOUS at 16:00

## 2020-07-04 RX ADMIN — HYDRALAZINE HYDROCHLORIDE PRN MG: 20 INJECTION INTRAMUSCULAR; INTRAVENOUS at 13:27

## 2020-07-04 RX ADMIN — SODIUM CHLORIDE SCH MG: 900 INJECTION INTRAVENOUS at 17:15

## 2020-07-04 RX ADMIN — INSULIN LISPRO SCH UNIT: 100 INJECTION, SOLUTION INTRAVENOUS; SUBCUTANEOUS at 06:33

## 2020-07-04 RX ADMIN — SODIUM CHLORIDE SCH MLS/HR: 9 INJECTION, SOLUTION INTRAVENOUS at 12:15

## 2020-07-04 RX ADMIN — INSULIN LISPRO SCH UNIT: 100 INJECTION, SOLUTION INTRAVENOUS; SUBCUTANEOUS at 07:45

## 2020-07-04 RX ADMIN — BACITRACIN SCH GM: 500 OINTMENT TOPICAL at 09:05

## 2020-07-04 RX ADMIN — METOPROLOL TARTRATE PRN MG: 1 INJECTION, SOLUTION INTRAVENOUS at 02:31

## 2020-07-04 RX ADMIN — SUCRALFATE SCH GM: 1 TABLET ORAL at 16:30

## 2020-07-04 RX ADMIN — VANCOMYCIN HYDROCHLORIDE SCH MLS/HR: 1 INJECTION, SOLUTION INTRAVENOUS at 04:21

## 2020-07-04 RX ADMIN — INSULIN LISPRO SCH UNIT: 100 INJECTION, SOLUTION INTRAVENOUS; SUBCUTANEOUS at 17:08

## 2020-07-04 RX ADMIN — SODIUM CHLORIDE SCH MLS/HR: 9 INJECTION, SOLUTION INTRAVENOUS at 17:57

## 2020-07-04 RX ADMIN — SUCRALFATE SCH GM: 1 TABLET ORAL at 07:30

## 2020-07-04 RX ADMIN — SUCRALFATE SCH GM: 1 TABLET ORAL at 11:30

## 2020-07-04 RX ADMIN — CEFEPIME HYDROCHLORIDE SCH MLS/HR: 1 INJECTION, POWDER, FOR SOLUTION INTRAMUSCULAR; INTRAVENOUS at 13:45

## 2020-07-04 NOTE — NUR
Received patient from day nurse, patient is in bilateral restraints for ams. Patient is 
alert and oriented x 1. bilateral wrist inspected and warm, pulses present, circulation 
intact, range of motion done, no signs of injury, skin intergrity is good, patient denied 
bm, no wet linen at this time, patient offered apple juice and accepted, patient is not in 
distress. patient not ready for dc of restraints upon assessment, patient is not following 
commands and reaching to pull medical devices, family aware,  Rachelle was called and ordered 
to continue restraints. Patient is currrently stable will continue to monitor.

## 2020-07-04 NOTE — NUR
Nutrition Screen Note



RD Recommendation for Physician: Continue diet as ordered with Glucerna TID with meals. Pt 
may require nutrition support if mental status does not improve and PO intake remains poor.



Plan of Care: RD following monitoring for tolerance and adequacy



Nutrition reason for involvement:

MD Consult, LOS 



Primary Diagnose(s): AMS, GIB weakness



PMH: T2DM



Ht: 66 in 

Wt:151  lbs     

BMI:24.4 kg/m2

IBW: 142 lbs     



RD Assessment: Initial encounter with patient. Consult received regarding oral nutrition 
supplement.  RD to provide Gucerna TID with meals. Pt with an altered mental status and has 
been too confused to eat. Pt also has an active GI Bleed and nutrition support via 
nasoenteric tube is probably not indicated at this time. Daughter was at bedside. Daughter 
states that pt was eating well PTA and had no known food allergies. Pt had no significant wt 
changes. SLP note reviewed.



Current Diet: 1600 calorie ADA 



Malnutrition Evaluation (7/4/2020)



     



The patient does not meet criteria for a specified degree of malnutrition at this time. Will 
re-evaluate at follow-up as appropriate. 







Diet Education Needs Assessment:

Diet education not indicated at this time due to altered mental status.



Diet tolerance: Diet tolereated 

     

Nutrition Care Level: Moderate





Sebastian Ybarra RD, LD, CNSC

## 2020-07-05 VITALS — SYSTOLIC BLOOD PRESSURE: 174 MMHG | DIASTOLIC BLOOD PRESSURE: 86 MMHG

## 2020-07-05 VITALS — SYSTOLIC BLOOD PRESSURE: 147 MMHG | DIASTOLIC BLOOD PRESSURE: 65 MMHG

## 2020-07-05 VITALS — DIASTOLIC BLOOD PRESSURE: 65 MMHG | SYSTOLIC BLOOD PRESSURE: 147 MMHG

## 2020-07-05 VITALS — SYSTOLIC BLOOD PRESSURE: 158 MMHG | DIASTOLIC BLOOD PRESSURE: 65 MMHG

## 2020-07-05 VITALS — SYSTOLIC BLOOD PRESSURE: 166 MMHG | DIASTOLIC BLOOD PRESSURE: 63 MMHG

## 2020-07-05 VITALS — SYSTOLIC BLOOD PRESSURE: 138 MMHG | DIASTOLIC BLOOD PRESSURE: 48 MMHG

## 2020-07-05 VITALS — SYSTOLIC BLOOD PRESSURE: 155 MMHG | DIASTOLIC BLOOD PRESSURE: 54 MMHG

## 2020-07-05 VITALS — DIASTOLIC BLOOD PRESSURE: 76 MMHG | SYSTOLIC BLOOD PRESSURE: 130 MMHG

## 2020-07-05 VITALS — DIASTOLIC BLOOD PRESSURE: 78 MMHG | SYSTOLIC BLOOD PRESSURE: 164 MMHG

## 2020-07-05 LAB
ALBUMIN SERPL-MCNC: 2.1 G/DL (ref 3.5–5)
ALBUMIN/GLOB SERPL: 0.7 {RATIO} (ref 0.8–2)
ALP SERPL-CCNC: 46 IU/L (ref 40–150)
ALT SERPL-CCNC: 26 IU/L (ref 0–55)
ANION GAP SERPL CALC-SCNC: 9.2 MMOL/L (ref 8–16)
BASOPHILS # BLD AUTO: 0 10*3/UL (ref 0–0.1)
BASOPHILS NFR BLD AUTO: 0.3 % (ref 0–1)
BUN SERPL-MCNC: 13 MG/DL (ref 7–26)
BUN/CREAT SERPL: 18 (ref 6–25)
CALCIUM SERPL-MCNC: 7.6 MG/DL (ref 8.4–10.2)
CHLORIDE SERPL-SCNC: 109 MMOL/L (ref 98–107)
CO2 SERPL-SCNC: 26 MMOL/L (ref 22–29)
DEPRECATED NEUTROPHILS # BLD AUTO: 8.3 10*3/UL (ref 2.1–6.9)
EGFRCR SERPLBLD CKD-EPI 2021: > 60 ML/MIN (ref 60–?)
EOSINOPHIL # BLD AUTO: 0.3 10*3/UL (ref 0–0.4)
EOSINOPHIL NFR BLD AUTO: 2.4 % (ref 0–6)
ERYTHROCYTE [DISTWIDTH] IN CORD BLOOD: 16 % (ref 11.7–14.4)
GLOBULIN PLAS-MCNC: 3 G/DL (ref 2.3–3.5)
GLUCOSE SERPLBLD-MCNC: 160 MG/DL (ref 74–118)
HCT VFR BLD AUTO: 20.6 % (ref 38.2–49.6)
HGB BLD-MCNC: 6.6 G/DL (ref 14–18)
LYMPHOCYTES # BLD: 1.2 10*3/UL (ref 1–3.2)
LYMPHOCYTES NFR BLD AUTO: 10.9 % (ref 18–39.1)
MAGNESIUM SERPL-MCNC: 1.7 MG/DL (ref 1.3–2.1)
MCH RBC QN AUTO: 28.3 PG (ref 28–32)
MCHC RBC AUTO-ENTMCNC: 32 G/DL (ref 31–35)
MCV RBC AUTO: 88.4 FL (ref 81–99)
MONOCYTES # BLD AUTO: 0.8 10*3/UL (ref 0.2–0.8)
MONOCYTES NFR BLD AUTO: 7.5 % (ref 4.4–11.3)
NEUTS SEG NFR BLD AUTO: 78.3 % (ref 38.7–80)
PLATELET # BLD AUTO: 306 X10E3/UL (ref 140–360)
POTASSIUM SERPL-SCNC: 3.2 MMOL/L (ref 3.5–5.1)
RBC # BLD AUTO: 2.33 X10E6/UL (ref 4.3–5.7)
SODIUM SERPL-SCNC: 141 MMOL/L (ref 136–145)

## 2020-07-05 RX ADMIN — VANCOMYCIN HYDROCHLORIDE SCH MLS/HR: 1 INJECTION, SOLUTION INTRAVENOUS at 05:13

## 2020-07-05 RX ADMIN — Medication SCH MG: at 08:32

## 2020-07-05 RX ADMIN — Medication SCH MG: at 16:36

## 2020-07-05 RX ADMIN — VANCOMYCIN HYDROCHLORIDE SCH MLS/HR: 1 INJECTION, SOLUTION INTRAVENOUS at 16:30

## 2020-07-05 RX ADMIN — Medication SCH MG: at 08:31

## 2020-07-05 RX ADMIN — SODIUM CHLORIDE SCH MG: 900 INJECTION INTRAVENOUS at 09:20

## 2020-07-05 RX ADMIN — OXYCODONE HYDROCHLORIDE AND ACETAMINOPHEN SCH MG: 500 TABLET ORAL at 08:31

## 2020-07-05 RX ADMIN — SODIUM CHLORIDE SCH MG: 900 INJECTION INTRAVENOUS at 16:36

## 2020-07-05 RX ADMIN — SUCRALFATE SCH GM: 1 TABLET ORAL at 07:30

## 2020-07-05 RX ADMIN — SUCRALFATE SCH GM: 1 TABLET ORAL at 11:30

## 2020-07-05 RX ADMIN — CEFEPIME HYDROCHLORIDE SCH MLS/HR: 1 INJECTION, POWDER, FOR SOLUTION INTRAMUSCULAR; INTRAVENOUS at 15:01

## 2020-07-05 RX ADMIN — METRONIDAZOLE SCH MLS/HR: 500 INJECTION, SOLUTION INTRAVENOUS at 16:30

## 2020-07-05 RX ADMIN — BACITRACIN SCH GM: 500 OINTMENT TOPICAL at 09:20

## 2020-07-05 RX ADMIN — INSULIN LISPRO SCH UNIT: 100 INJECTION, SOLUTION INTRAVENOUS; SUBCUTANEOUS at 13:55

## 2020-07-05 RX ADMIN — SODIUM CHLORIDE SCH MLS/HR: 9 INJECTION, SOLUTION INTRAVENOUS at 12:26

## 2020-07-05 RX ADMIN — SUCRALFATE SCH GM: 1 TABLET ORAL at 16:35

## 2020-07-05 RX ADMIN — Medication SCH MG: at 09:30

## 2020-07-05 RX ADMIN — INSULIN LISPRO SCH UNIT: 100 INJECTION, SOLUTION INTRAVENOUS; SUBCUTANEOUS at 19:00

## 2020-07-05 RX ADMIN — ZINC OXIDE SCH GM: 200 OINTMENT TOPICAL at 09:20

## 2020-07-05 RX ADMIN — METRONIDAZOLE SCH MLS/HR: 500 INJECTION, SOLUTION INTRAVENOUS at 22:15

## 2020-07-05 RX ADMIN — SUCRALFATE SCH GM: 1 TABLET ORAL at 22:15

## 2020-07-05 RX ADMIN — LORAZEPAM PRN MG: 2 INJECTION INTRAMUSCULAR; INTRAVENOUS at 00:14

## 2020-07-05 RX ADMIN — SODIUM CHLORIDE SCH MLS/HR: 9 INJECTION, SOLUTION INTRAVENOUS at 13:07

## 2020-07-05 RX ADMIN — INSULIN LISPRO SCH UNIT: 100 INJECTION, SOLUTION INTRAVENOUS; SUBCUTANEOUS at 01:00

## 2020-07-05 RX ADMIN — INSULIN LISPRO SCH UNIT: 100 INJECTION, SOLUTION INTRAVENOUS; SUBCUTANEOUS at 07:00

## 2020-07-05 RX ADMIN — METRONIDAZOLE SCH MLS/HR: 500 INJECTION, SOLUTION INTRAVENOUS at 06:00

## 2020-07-05 RX ADMIN — OXYCODONE HYDROCHLORIDE AND ACETAMINOPHEN SCH MG: 500 TABLET ORAL at 16:36

## 2020-07-06 VITALS — DIASTOLIC BLOOD PRESSURE: 74 MMHG | SYSTOLIC BLOOD PRESSURE: 145 MMHG

## 2020-07-06 VITALS — SYSTOLIC BLOOD PRESSURE: 127 MMHG | DIASTOLIC BLOOD PRESSURE: 59 MMHG

## 2020-07-06 VITALS — DIASTOLIC BLOOD PRESSURE: 73 MMHG | SYSTOLIC BLOOD PRESSURE: 160 MMHG

## 2020-07-06 VITALS — SYSTOLIC BLOOD PRESSURE: 199 MMHG | DIASTOLIC BLOOD PRESSURE: 85 MMHG

## 2020-07-06 VITALS — DIASTOLIC BLOOD PRESSURE: 69 MMHG | SYSTOLIC BLOOD PRESSURE: 153 MMHG

## 2020-07-06 LAB
ALBUMIN SERPL-MCNC: 1.8 G/DL (ref 3.5–5)
ALBUMIN/GLOB SERPL: 0.7 {RATIO} (ref 0.8–2)
ALP SERPL-CCNC: 37 IU/L (ref 40–150)
ALT SERPL-CCNC: 23 IU/L (ref 0–55)
ANION GAP SERPL CALC-SCNC: 8.1 MMOL/L (ref 8–16)
ANISOCYTOSIS BLD QL SMEAR: SLIGHT
BASOPHILS # BLD AUTO: 0.1 10*3/UL (ref 0–0.1)
BASOPHILS NFR BLD AUTO: 0.6 % (ref 0–1)
BUN SERPL-MCNC: 11 MG/DL (ref 7–26)
BUN/CREAT SERPL: 18 (ref 6–25)
CA-I BLDV-MCNC: 1.2 MMOL/L (ref 1.09–1.3)
CALCIUM SERPL-MCNC: 6.5 MG/DL (ref 8.4–10.2)
CALCIUM SERPL-MCNC: 8.6 MG/DL (ref 8.4–10.2)
CHLORIDE SERPL-SCNC: 114 MMOL/L (ref 98–107)
CO2 SERPL-SCNC: 22 MMOL/L (ref 22–29)
DEPRECATED NEUTROPHILS # BLD AUTO: 6.5 10*3/UL (ref 2.1–6.9)
EGFRCR SERPLBLD CKD-EPI 2021: > 60 ML/MIN (ref 60–?)
EOSINOPHIL # BLD AUTO: 0.2 10*3/UL (ref 0–0.4)
EOSINOPHIL NFR BLD AUTO: 2.7 % (ref 0–6)
ERYTHROCYTE [DISTWIDTH] IN CORD BLOOD: 15.6 % (ref 11.7–14.4)
GLOBULIN PLAS-MCNC: 2.5 G/DL (ref 2.3–3.5)
GLUCOSE SERPLBLD-MCNC: 104 MG/DL (ref 74–118)
HCT VFR BLD AUTO: 23.8 % (ref 38.2–49.6)
HCT VFR BLD AUTO: 31 % (ref 38.2–49.6)
HGB BLD-MCNC: 10.1 G/DL (ref 14–18)
HGB BLD-MCNC: 7.7 G/DL (ref 14–18)
LYMPHOCYTES # BLD: 1.4 10*3/UL (ref 1–3.2)
LYMPHOCYTES NFR BLD AUTO: 15.4 % (ref 18–39.1)
MCH RBC QN AUTO: 28 PG (ref 28–32)
MCHC RBC AUTO-ENTMCNC: 32.4 G/DL (ref 31–35)
MCV RBC AUTO: 86.5 FL (ref 81–99)
MONOCYTES # BLD AUTO: 0.8 10*3/UL (ref 0.2–0.8)
MONOCYTES NFR BLD AUTO: 8.6 % (ref 4.4–11.3)
NEUTS SEG NFR BLD AUTO: 72 % (ref 38.7–80)
PLAT MORPH BLD: (no result)
PLATELET # BLD AUTO: 217 X10E3/UL (ref 140–360)
PLATELET # BLD EST: ADEQUATE 10*3/UL
POTASSIUM SERPL-SCNC: 3.1 MMOL/L (ref 3.5–5.1)
RBC # BLD AUTO: 2.75 X10E6/UL (ref 4.3–5.7)
RBC MORPH BLD: NORMAL
SODIUM SERPL-SCNC: 141 MMOL/L (ref 136–145)

## 2020-07-06 RX ADMIN — OXYCODONE HYDROCHLORIDE AND ACETAMINOPHEN SCH MG: 500 TABLET ORAL at 17:06

## 2020-07-06 RX ADMIN — BACITRACIN SCH GM: 500 OINTMENT TOPICAL at 08:42

## 2020-07-06 RX ADMIN — SODIUM CHLORIDE SCH MLS/HR: 9 INJECTION, SOLUTION INTRAVENOUS at 14:05

## 2020-07-06 RX ADMIN — VANCOMYCIN HYDROCHLORIDE SCH MLS/HR: 1 INJECTION, SOLUTION INTRAVENOUS at 17:06

## 2020-07-06 RX ADMIN — METOPROLOL TARTRATE SCH MG: 25 TABLET, FILM COATED ORAL at 09:28

## 2020-07-06 RX ADMIN — Medication SCH MG: at 17:06

## 2020-07-06 RX ADMIN — INSULIN LISPRO SCH UNIT: 100 INJECTION, SOLUTION INTRAVENOUS; SUBCUTANEOUS at 14:32

## 2020-07-06 RX ADMIN — METRONIDAZOLE SCH MLS/HR: 500 INJECTION, SOLUTION INTRAVENOUS at 14:06

## 2020-07-06 RX ADMIN — Medication SCH MG: at 08:42

## 2020-07-06 RX ADMIN — VANCOMYCIN HYDROCHLORIDE SCH MLS/HR: 1 INJECTION, SOLUTION INTRAVENOUS at 05:00

## 2020-07-06 RX ADMIN — Medication SCH MG: at 17:07

## 2020-07-06 RX ADMIN — SUCRALFATE SCH GM: 1 TABLET ORAL at 11:56

## 2020-07-06 RX ADMIN — CEFEPIME HYDROCHLORIDE SCH MLS/HR: 1 INJECTION, POWDER, FOR SOLUTION INTRAMUSCULAR; INTRAVENOUS at 14:06

## 2020-07-06 RX ADMIN — METRONIDAZOLE SCH MLS/HR: 500 INJECTION, SOLUTION INTRAVENOUS at 07:00

## 2020-07-06 RX ADMIN — SUCRALFATE SCH GM: 1 TABLET ORAL at 08:40

## 2020-07-06 RX ADMIN — SODIUM CHLORIDE SCH MG: 900 INJECTION INTRAVENOUS at 08:40

## 2020-07-06 RX ADMIN — SODIUM CHLORIDE SCH MLS/HR: 9 INJECTION, SOLUTION INTRAVENOUS at 10:42

## 2020-07-06 RX ADMIN — SODIUM CHLORIDE SCH MG: 900 INJECTION INTRAVENOUS at 17:06

## 2020-07-06 RX ADMIN — ZINC OXIDE SCH GM: 200 OINTMENT TOPICAL at 08:42

## 2020-07-06 RX ADMIN — INSULIN LISPRO SCH UNIT: 100 INJECTION, SOLUTION INTRAVENOUS; SUBCUTANEOUS at 07:00

## 2020-07-06 RX ADMIN — INSULIN LISPRO SCH UNIT: 100 INJECTION, SOLUTION INTRAVENOUS; SUBCUTANEOUS at 01:00

## 2020-07-06 RX ADMIN — METOPROLOL TARTRATE SCH MG: 25 TABLET, FILM COATED ORAL at 17:06

## 2020-07-06 RX ADMIN — HYDRALAZINE HYDROCHLORIDE PRN MG: 20 INJECTION INTRAMUSCULAR; INTRAVENOUS at 14:46

## 2020-07-06 RX ADMIN — OXYCODONE HYDROCHLORIDE AND ACETAMINOPHEN SCH MG: 500 TABLET ORAL at 08:42

## 2020-07-06 RX ADMIN — SUCRALFATE SCH GM: 1 TABLET ORAL at 20:13

## 2020-07-06 RX ADMIN — SUCRALFATE SCH GM: 1 TABLET ORAL at 17:06

## 2020-07-06 RX ADMIN — INSULIN LISPRO SCH UNIT: 100 INJECTION, SOLUTION INTRAVENOUS; SUBCUTANEOUS at 20:43

## 2020-07-06 RX ADMIN — METRONIDAZOLE SCH MLS/HR: 500 INJECTION, SOLUTION INTRAVENOUS at 21:41

## 2020-07-06 NOTE — DIAGNOSTIC IMAGING REPORT
Tagged-RBC GI Bleed Study



Clinical information: 59-year-old male with anemia, AMS and black stools.



Discussion: The patient's own red blood cells were labeled with 23.7 mCi of

technetium-99m pertechnetate using the in vitro method (UltraTag).  Dynamic

images of the abdomen were obtained through 60 minutes.



Distribution of tracer activity appears physiologic throughout the abdomen.  No

abnormal accumulation of tracer is seen within the gastrointestinal lumen.



Impression: 



No scan evidence of active gastrointestinal bleeding at this time.  



Signed by: Dr. Jayshree Miller M.D. on 7/6/2020 7:02 PM

## 2020-07-07 VITALS — DIASTOLIC BLOOD PRESSURE: 73 MMHG | SYSTOLIC BLOOD PRESSURE: 126 MMHG

## 2020-07-07 VITALS — SYSTOLIC BLOOD PRESSURE: 147 MMHG | DIASTOLIC BLOOD PRESSURE: 60 MMHG

## 2020-07-07 VITALS — SYSTOLIC BLOOD PRESSURE: 147 MMHG | DIASTOLIC BLOOD PRESSURE: 86 MMHG

## 2020-07-07 VITALS — DIASTOLIC BLOOD PRESSURE: 80 MMHG | SYSTOLIC BLOOD PRESSURE: 169 MMHG

## 2020-07-07 VITALS — SYSTOLIC BLOOD PRESSURE: 132 MMHG | DIASTOLIC BLOOD PRESSURE: 69 MMHG

## 2020-07-07 VITALS — DIASTOLIC BLOOD PRESSURE: 108 MMHG | SYSTOLIC BLOOD PRESSURE: 182 MMHG

## 2020-07-07 VITALS — SYSTOLIC BLOOD PRESSURE: 169 MMHG | DIASTOLIC BLOOD PRESSURE: 80 MMHG

## 2020-07-07 LAB
ALBUMIN SERPL-MCNC: 2.2 G/DL (ref 3.5–5)
ALBUMIN/GLOB SERPL: 0.7 {RATIO} (ref 0.8–2)
ALP SERPL-CCNC: 49 IU/L (ref 40–150)
ALT SERPL-CCNC: 31 IU/L (ref 0–55)
ANION GAP SERPL CALC-SCNC: 11.5 MMOL/L (ref 8–16)
BASOPHILS # BLD AUTO: 0 10*3/UL (ref 0–0.1)
BASOPHILS NFR BLD AUTO: 0.4 % (ref 0–1)
BUN SERPL-MCNC: 10 MG/DL (ref 7–26)
BUN/CREAT SERPL: 14 (ref 6–25)
CALCIUM SERPL-MCNC: 8.2 MG/DL (ref 8.4–10.2)
CHLORIDE SERPL-SCNC: 104 MMOL/L (ref 98–107)
CO2 SERPL-SCNC: 25 MMOL/L (ref 22–29)
DEPRECATED NEUTROPHILS # BLD AUTO: 6.8 10*3/UL (ref 2.1–6.9)
EGFRCR SERPLBLD CKD-EPI 2021: > 60 ML/MIN (ref 60–?)
EOSINOPHIL # BLD AUTO: 0.4 10*3/UL (ref 0–0.4)
EOSINOPHIL NFR BLD AUTO: 4 % (ref 0–6)
ERYTHROCYTE [DISTWIDTH] IN CORD BLOOD: 15 % (ref 11.7–14.4)
GLOBULIN PLAS-MCNC: 3 G/DL (ref 2.3–3.5)
GLUCOSE SERPLBLD-MCNC: 121 MG/DL (ref 74–118)
HCT VFR BLD AUTO: 27.5 % (ref 38.2–49.6)
HGB BLD-MCNC: 9.3 G/DL (ref 14–18)
LYMPHOCYTES # BLD: 1.5 10*3/UL (ref 1–3.2)
LYMPHOCYTES NFR BLD AUTO: 15.3 % (ref 18–39.1)
MCH RBC QN AUTO: 28.5 PG (ref 28–32)
MCHC RBC AUTO-ENTMCNC: 33.8 G/DL (ref 31–35)
MCV RBC AUTO: 84.4 FL (ref 81–99)
MONOCYTES # BLD AUTO: 1 10*3/UL (ref 0.2–0.8)
MONOCYTES NFR BLD AUTO: 9.9 % (ref 4.4–11.3)
NEUTS SEG NFR BLD AUTO: 69.9 % (ref 38.7–80)
PLATELET # BLD AUTO: 378 X10E3/UL (ref 140–360)
POTASSIUM SERPL-SCNC: 3.5 MMOL/L (ref 3.5–5.1)
RBC # BLD AUTO: 3.26 X10E6/UL (ref 4.3–5.7)
SODIUM SERPL-SCNC: 137 MMOL/L (ref 136–145)

## 2020-07-07 RX ADMIN — OXYCODONE HYDROCHLORIDE AND ACETAMINOPHEN SCH MG: 500 TABLET ORAL at 07:47

## 2020-07-07 RX ADMIN — Medication SCH MG: at 07:47

## 2020-07-07 RX ADMIN — VANCOMYCIN HYDROCHLORIDE SCH MLS/HR: 1 INJECTION, SOLUTION INTRAVENOUS at 04:00

## 2020-07-07 RX ADMIN — SODIUM CHLORIDE SCH MG: 900 INJECTION INTRAVENOUS at 07:46

## 2020-07-07 RX ADMIN — ZINC OXIDE SCH GM: 200 OINTMENT TOPICAL at 07:47

## 2020-07-07 RX ADMIN — HYDRALAZINE HYDROCHLORIDE PRN MG: 20 INJECTION INTRAMUSCULAR; INTRAVENOUS at 13:29

## 2020-07-07 RX ADMIN — SUCRALFATE SCH GM: 1 TABLET ORAL at 11:51

## 2020-07-07 RX ADMIN — METRONIDAZOLE SCH MLS/HR: 500 INJECTION, SOLUTION INTRAVENOUS at 05:51

## 2020-07-07 RX ADMIN — SODIUM CHLORIDE SCH MLS/HR: 9 INJECTION, SOLUTION INTRAVENOUS at 11:39

## 2020-07-07 RX ADMIN — INSULIN LISPRO SCH UNIT: 100 INJECTION, SOLUTION INTRAVENOUS; SUBCUTANEOUS at 07:41

## 2020-07-07 RX ADMIN — INSULIN LISPRO SCH UNIT: 100 INJECTION, SOLUTION INTRAVENOUS; SUBCUTANEOUS at 12:07

## 2020-07-07 RX ADMIN — METRONIDAZOLE SCH MLS/HR: 500 INJECTION, SOLUTION INTRAVENOUS at 13:28

## 2020-07-07 RX ADMIN — SUCRALFATE SCH GM: 1 TABLET ORAL at 07:46

## 2020-07-07 RX ADMIN — SODIUM CHLORIDE SCH MLS/HR: 9 INJECTION, SOLUTION INTRAVENOUS at 05:51

## 2020-07-07 RX ADMIN — CEFEPIME HYDROCHLORIDE SCH MLS/HR: 1 INJECTION, POWDER, FOR SOLUTION INTRAMUSCULAR; INTRAVENOUS at 13:28

## 2020-07-07 RX ADMIN — METOPROLOL TARTRATE SCH MG: 25 TABLET, FILM COATED ORAL at 07:47

## 2020-07-07 RX ADMIN — INSULIN LISPRO SCH UNIT: 100 INJECTION, SOLUTION INTRAVENOUS; SUBCUTANEOUS at 01:00

## 2020-07-07 NOTE — DISCHARGE SUMMARY
PRIMARY CARE PHYSICIAN:  He does not have a PCP.

 

CONSULTING PHYSICIANS:  Included:

1. Johan Lopez M.D.

2. Stanley Lira M.D.

3. Ranjit Cagle M.D.

4. Cosme Bond M.D. 

5. Nicola Fitch DPM.

 

CHIEF COMPLAINT:  Pale, weak, with black stools.

 

HISTORY OF PRESENT ILLNESS:  The patient is a 59-year-old  male, who was

admitted with black stools and significant weakness.  In the Emergency Department, vital

signs were temperature 99.8, heart rate 102, respirations 14, blood pressure 151/64 with

an oxygen saturation of 100%.  At that time, he was confused, oriented to person only.

His daughter stated that the patient worked on Sunday, did not eat, vomited, complained

of leg numbness, felt tired and wanted to sleep.  The patient went to a clinic on the

Saturday prior to admission due to a cut on his foot.  As far as the daughter knows, no

medications or treatments were prescribed at that time. 

 

PAST MEDICAL HISTORY:  Diabetes mellitus.

 

PAST SURGICAL HISTORY:  None.

 

FAMILY HISTORY:  Unknown per his daughter.

 

SOCIAL HISTORY:  The patient had a 5-year history of heavy smoking, 3-4 packs per day as

well as a 5-year history of heavy drinking, six pack of beer per day.  Denies any

illicit drugs.  He has stopped smoking and drinking about 3 or 4 years ago.  He is

, with children, has several daughters.  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 

 

ALLERGIES:  NO KNOWN ALLERGIES.

 

ADMITTING DIAGNOSES:  

1. Gastrointestinal bleed with severe anemia due to acute blood loss.

2. Sepsis versus SIRS, POA.

3. Altered mental status, likely due to severe anemia, more so than infection.

4. Acute kidney injury, baseline creatinine and GFR are unknown.

5. Uncontrolled type 2 diabetes mellitus with hyperglycemia.

6. Right foot wound laceration.

7. Mild acute hyponatremia.

8. Probable severe obstructive sleep apnea.

 

DISCHARGE DIAGNOSES:  

1. Acute blood loss anemia secondary to gastrointestinal bleed.

2. Aspiration pneumonia.

3. Paroxysmal atrial fibrillation.

4. Hypocalcemia.

5. Hypokalemia. 

On admission, his H and H were extremely low, hemoglobin 4.6 and hematocrit 13.7,

subsequently 4.3 and 13.  He was given a total of 4 units of blood during his stay.

Today, hemoglobin 9.3, hematocrit 27.5, and WBCs are 9.72.  On admission, sodium 134,

potassium 4.6, chloride 97, CO2 of 29, anion gap 12.6, BUN 91, creatinine 1.31,

estimated GFR 56, glucose 320, hemoglobin A1c 6.3%, and calcium 8.8.  Total bilirubin

0.2, AST 16, ALT 17, and alkaline phosphatase 43.  He was on vancomycin and vancomycin

trough level is within normal limits.  Fecal occult blood test on 06/29 was positive.

The Coronavirus PCR not detected on 06/29.  Blood cultures x2 collected on 06/29 showed

no growth after 5 days.  Initial chest x-ray was negative.  CT of the brain showed no

acute intracranial abnormalities.  CT of the chest on 07/01 showed the findings

consistent with bilateral multifocal pneumonia, possibly aspiration in the proper

clinical settings, bilateral small pleural effusions.  He underwent a nuclear medicine

GI bleed scan on 07/06, which showed no evidence of acute gastrointestinal bleeding.

The EGD and colonoscopy on 07/01, EGD showed esophagitis, duodenitis, gastritis, and

gastric ulcer.  The patient was confused with metabolic encephalopathy, required

one-to-one sitter, wrist restraints, p.r.n. Ativan, had iatrogenic PICC removal on or

about 06/30.  He had a grade 2 ulceration in the plantar aspect of the right foot

without active bleeding, which Podiatry followed in, there was offloading Bactroban

cream, 5 wound healing medications, vancomycin and cefepime per Infectious Disease.  He

seemed to have obvious obstructive sleep apnea when I saw him on or about 06/29, but the

next day, his sleep looked much better controlled.  He did require BiPAP use at least

one night.  The patient also has a history of COPD without acute exacerbation and acute

metabolic encephalopathy is likely due to the bilateral multifocal pneumonia, which is

improved with antibiotics.  The patient is currently alert and oriented x3.  BiPAP was

18/6.  He did develop atrial fibrillation with RVR and Cardiology followed.  He is on

amiodarone drip, which was changed to oral and then metoprolol 25 mg p.o. b.i.d., still

having some hypertension.  Blood pressure today 169/80, temperature 98.1, heart rate 80,

respirations 20, oxygen saturation 97% on room air.  No complaints.  Venofer infusing at

50 mL an hour for possible cause of multifocal pneumonia aspiration with WBC count 18.8

on 07/03.  Dysphagia, improved at this point.  He was on pureed diet.  Final 2 units of

blood were ordered on 07/05, poor appetite had been noted.  At the time of discharge on

cefepime, Flagyl, and vancomycin IV, however, Infectious Disease states that the patient

no longer needs IV antibiotics, no prescriptions for outpatient antibiotics noted per

TOSHA Sanders with Dr. Lira.  The patient go home on metoprolol 25 mg p.o. b.i.d.  Today,

sodium 137, potassium 3.5, chloride 104, CO2 of 25, BUN 10, creatinine 0.73, and glucose

121.  The patient continue ADA diet.  Activity level as tolerated.  Follow up with PCP

in 1-2 weeks. 

 

 

Dictated by Rafael Antoine NP

 

______________________________

MD HOLDEN Gtz/KARIS

D:  07/07/2020 14:00:32

T:  07/07/2020 16:08:54

Job #:  [id number]

## 2020-12-11 ENCOUNTER — HOSPITAL ENCOUNTER (EMERGENCY)
Dept: HOSPITAL 88 - ER | Age: 60
Discharge: OTHER INSTITUTION | End: 2020-12-11
Payer: SELF-PAY

## 2020-12-11 VITALS — BODY MASS INDEX: 24.59 KG/M2 | WEIGHT: 153 LBS | HEIGHT: 66 IN

## 2020-12-11 DIAGNOSIS — E11.621: Primary | ICD-10-CM

## 2020-12-11 DIAGNOSIS — E78.5: ICD-10-CM

## 2020-12-11 DIAGNOSIS — I10: ICD-10-CM

## 2020-12-11 DIAGNOSIS — L08.89: ICD-10-CM

## 2020-12-11 LAB
ALBUMIN SERPL-MCNC: 4 G/DL (ref 3.5–5)
ALBUMIN/GLOB SERPL: 0.9 {RATIO} (ref 0.8–2)
ALP SERPL-CCNC: 103 IU/L (ref 40–150)
ALT SERPL-CCNC: 21 IU/L (ref 0–55)
ANION GAP SERPL CALC-SCNC: 13.9 MMOL/L (ref 8–16)
BASOPHILS # BLD AUTO: 0.1 10*3/UL (ref 0–0.1)
BASOPHILS NFR BLD AUTO: 0.5 % (ref 0–1)
BUN SERPL-MCNC: 24 MG/DL (ref 7–26)
BUN/CREAT SERPL: 25 (ref 6–25)
CALCIUM SERPL-MCNC: 10.1 MG/DL (ref 8.4–10.2)
CHLORIDE SERPL-SCNC: 96 MMOL/L (ref 98–107)
CO2 SERPL-SCNC: 29 MMOL/L (ref 22–29)
DEPRECATED NEUTROPHILS # BLD AUTO: 10 10*3/UL (ref 2.1–6.9)
EGFRCR SERPLBLD CKD-EPI 2021: > 60 ML/MIN (ref 60–?)
EOSINOPHIL # BLD AUTO: 0.1 10*3/UL (ref 0–0.4)
EOSINOPHIL NFR BLD AUTO: 0.8 % (ref 0–6)
ERYTHROCYTE [DISTWIDTH] IN CORD BLOOD: 12.9 % (ref 11.7–14.4)
GLOBULIN PLAS-MCNC: 4.3 G/DL (ref 2.3–3.5)
GLUCOSE SERPLBLD-MCNC: 107 MG/DL (ref 74–118)
HCT VFR BLD AUTO: 31.9 % (ref 38.2–49.6)
HGB BLD-MCNC: 10.3 G/DL (ref 14–18)
LYMPHOCYTES # BLD: 2.3 10*3/UL (ref 1–3.2)
LYMPHOCYTES NFR BLD AUTO: 16.8 % (ref 18–39.1)
MCH RBC QN AUTO: 27.4 PG (ref 28–32)
MCHC RBC AUTO-ENTMCNC: 32.3 G/DL (ref 31–35)
MCV RBC AUTO: 84.8 FL (ref 81–99)
MONOCYTES # BLD AUTO: 1 10*3/UL (ref 0.2–0.8)
MONOCYTES NFR BLD AUTO: 7.1 % (ref 4.4–11.3)
NEUTS SEG NFR BLD AUTO: 74.4 % (ref 38.7–80)
PLATELET # BLD AUTO: 455 X10E3/UL (ref 140–360)
POTASSIUM SERPL-SCNC: 4.9 MMOL/L (ref 3.5–5.1)
RBC # BLD AUTO: 3.76 X10E6/UL (ref 4.3–5.7)
SODIUM SERPL-SCNC: 134 MMOL/L (ref 136–145)

## 2020-12-11 PROCEDURE — 87205 SMEAR GRAM STAIN: CPT

## 2020-12-11 PROCEDURE — 80053 COMPREHEN METABOLIC PANEL: CPT

## 2020-12-11 PROCEDURE — 87186 SC STD MICRODIL/AGAR DIL: CPT

## 2020-12-11 PROCEDURE — 36415 COLL VENOUS BLD VENIPUNCTURE: CPT

## 2020-12-11 PROCEDURE — 83605 ASSAY OF LACTIC ACID: CPT

## 2020-12-11 PROCEDURE — 87071 CULTURE AEROBIC QUANT OTHER: CPT

## 2020-12-11 PROCEDURE — 85025 COMPLETE CBC W/AUTO DIFF WBC: CPT

## 2020-12-11 PROCEDURE — 87040 BLOOD CULTURE FOR BACTERIA: CPT

## 2020-12-11 PROCEDURE — 73630 X-RAY EXAM OF FOOT: CPT

## 2020-12-11 PROCEDURE — 99284 EMERGENCY DEPT VISIT MOD MDM: CPT

## 2024-12-27 ENCOUNTER — HOSPITAL ENCOUNTER (INPATIENT)
Dept: HOSPITAL 88 - ER | Age: 64
LOS: 8 days | Discharge: HOME | DRG: 871 | End: 2025-01-04
Attending: INTERNAL MEDICINE | Admitting: INTERNAL MEDICINE
Payer: MEDICARE

## 2024-12-27 VITALS
OXYGEN SATURATION: 96 % | RESPIRATION RATE: 18 BRPM | SYSTOLIC BLOOD PRESSURE: 148 MMHG | DIASTOLIC BLOOD PRESSURE: 68 MMHG | TEMPERATURE: 98.6 F | HEART RATE: 90 BPM

## 2024-12-27 VITALS
HEART RATE: 77 BPM | OXYGEN SATURATION: 97 % | RESPIRATION RATE: 17 BRPM | DIASTOLIC BLOOD PRESSURE: 70 MMHG | TEMPERATURE: 98.2 F | SYSTOLIC BLOOD PRESSURE: 146 MMHG

## 2024-12-27 VITALS
DIASTOLIC BLOOD PRESSURE: 68 MMHG | SYSTOLIC BLOOD PRESSURE: 161 MMHG | TEMPERATURE: 97.8 F | RESPIRATION RATE: 18 BRPM | HEART RATE: 88 BPM | OXYGEN SATURATION: 97 %

## 2024-12-27 VITALS — HEART RATE: 89 BPM | OXYGEN SATURATION: 98 % | RESPIRATION RATE: 18 BRPM

## 2024-12-27 VITALS
SYSTOLIC BLOOD PRESSURE: 148 MMHG | OXYGEN SATURATION: 96 % | DIASTOLIC BLOOD PRESSURE: 64 MMHG | RESPIRATION RATE: 18 BRPM | TEMPERATURE: 98.6 F | HEART RATE: 90 BPM

## 2024-12-27 VITALS — BODY MASS INDEX: 24.59 KG/M2 | HEIGHT: 66 IN | WEIGHT: 153 LBS

## 2024-12-27 VITALS
DIASTOLIC BLOOD PRESSURE: 59 MMHG | OXYGEN SATURATION: 98 % | HEART RATE: 77 BPM | SYSTOLIC BLOOD PRESSURE: 134 MMHG | RESPIRATION RATE: 17 BRPM | TEMPERATURE: 98.4 F

## 2024-12-27 VITALS
SYSTOLIC BLOOD PRESSURE: 134 MMHG | TEMPERATURE: 98.4 F | RESPIRATION RATE: 17 BRPM | DIASTOLIC BLOOD PRESSURE: 59 MMHG | OXYGEN SATURATION: 98 % | HEART RATE: 77 BPM

## 2024-12-27 VITALS — HEART RATE: 81 BPM | TEMPERATURE: 98.5 F

## 2024-12-27 VITALS
DIASTOLIC BLOOD PRESSURE: 62 MMHG | RESPIRATION RATE: 18 BRPM | SYSTOLIC BLOOD PRESSURE: 156 MMHG | TEMPERATURE: 98.4 F | OXYGEN SATURATION: 94 % | HEART RATE: 86 BPM

## 2024-12-27 VITALS
HEART RATE: 77 BPM | RESPIRATION RATE: 17 BRPM | OXYGEN SATURATION: 97 % | DIASTOLIC BLOOD PRESSURE: 70 MMHG | SYSTOLIC BLOOD PRESSURE: 146 MMHG | TEMPERATURE: 98.2 F

## 2024-12-27 DIAGNOSIS — R29.6: ICD-10-CM

## 2024-12-27 DIAGNOSIS — N18.9: ICD-10-CM

## 2024-12-27 DIAGNOSIS — I12.9: ICD-10-CM

## 2024-12-27 DIAGNOSIS — Z11.52: ICD-10-CM

## 2024-12-27 DIAGNOSIS — Z16.24: ICD-10-CM
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
## 2024-12-27 DIAGNOSIS — R00.0: ICD-10-CM

## 2024-12-27 DIAGNOSIS — I82.611: ICD-10-CM

## 2024-12-27 DIAGNOSIS — A41.51: Primary | ICD-10-CM

## 2024-12-27 DIAGNOSIS — R31.29: ICD-10-CM

## 2024-12-27 DIAGNOSIS — Z16.12: ICD-10-CM

## 2024-12-27 DIAGNOSIS — G92.8: ICD-10-CM

## 2024-12-27 DIAGNOSIS — E11.22: ICD-10-CM

## 2024-12-27 DIAGNOSIS — N39.0: ICD-10-CM

## 2024-12-27 DIAGNOSIS — Z79.899: ICD-10-CM

## 2024-12-27 DIAGNOSIS — R65.20: ICD-10-CM

## 2024-12-27 DIAGNOSIS — T80.1XXA: ICD-10-CM

## 2024-12-27 DIAGNOSIS — E87.1: ICD-10-CM

## 2024-12-27 DIAGNOSIS — N17.9: ICD-10-CM

## 2024-12-27 DIAGNOSIS — E11.65: ICD-10-CM

## 2024-12-27 DIAGNOSIS — D50.9: ICD-10-CM

## 2024-12-27 DIAGNOSIS — R80.9: ICD-10-CM

## 2024-12-27 DIAGNOSIS — N40.0: ICD-10-CM

## 2024-12-27 DIAGNOSIS — I80.8: ICD-10-CM

## 2024-12-27 DIAGNOSIS — E78.5: ICD-10-CM

## 2024-12-27 DIAGNOSIS — T80.818A: ICD-10-CM

## 2024-12-27 LAB
ALBUMIN SERPL-MCNC: 2.9 G/DL (ref 3.5–5)
ALBUMIN/GLOB SERPL: 0.8 {RATIO} (ref 0.8–2)
ALP SERPL-CCNC: 72 IU/L (ref 40–150)
ALT SERPL-CCNC: 31 IU/L (ref 0–55)
ANION GAP SERPL CALC-SCNC: 16.1 MMOL/L (ref 8–16)
BACTERIA URNS QL MICRO: (no result) /HPF
BASOPHILS # BLD AUTO: 0 10*3/UL (ref 0–0.1)
BASOPHILS NFR BLD AUTO: 0.2 % (ref 0–1)
BILIRUB SERPL-MCNC: 0.6 MG/DL (ref 0.2–1.2)
BILIRUB UR QL: NEGATIVE
BUN SERPL-MCNC: 47 MG/DL (ref 7–26)
BUN/CREAT SERPL: 29 (ref 6–25)
CALCIUM SERPL-MCNC: 9.1 MG/DL (ref 8.4–10.2)
CHLORIDE SERPL-SCNC: 99 MMOL/L (ref 98–107)
CK SERPL-CCNC: 278 IU/L (ref 30–200)
CLARITY UR: (no result)
CO2 SERPL-SCNC: 20 MMOL/L (ref 22–29)
COLOR UR: YELLOW
COMMENT: (no result)
CORONAVIRUS COVID-19 AG: NEGATIVE
DEPRECATED NEUTROPHILS # BLD AUTO: 14.6 10*3/UL (ref 2.1–6.9)
DEPRECATED RBC URNS MANUAL-ACNC: (no result) /HPF (ref 0–5)
EGFRCR SERPLBLD CKD-EPI 2021: 46 ML/MIN (ref 60–?)
EOSINOPHIL # BLD AUTO: 0 10*3/UL (ref 0–0.4)
EOSINOPHIL NFR BLD AUTO: 0 % (ref 0–6)
EPI CELLS URNS QL MICRO: (no result) /LPF
ERYTHROCYTE [DISTWIDTH] IN CORD BLOOD: 13.9 % (ref 11.7–14.4)
GLOBULIN PLAS-MCNC: 3.8 G/DL (ref 2.3–3.5)
GLUCOSE SERPLBLD-MCNC: 189 MG/DL (ref 74–118)
GLUCOSE UR QL STRIP.AUTO: NEGATIVE
HCT VFR BLD AUTO: 25.9 % (ref 38.2–49.6)
HGB BLD-MCNC: 8.2 G/DL (ref 14–18)
INFLUENZA A AG: NEGATIVE
INFLUENZA B AG: NEGATIVE
KETONES UR QL STRIP.AUTO: (no result)
LEUKOCYTE ESTERASE UR QL STRIP.AUTO: (no result)
LYMPHOCYTES # BLD: 0.5 10*3/UL (ref 1–3.2)
LYMPHOCYTES NFR BLD AUTO: 2.9 % (ref 18–39.1)
MCH RBC QN AUTO: 27.2 PG (ref 28–32)
MCHC RBC AUTO-ENTMCNC: 31.7 G/DL (ref 31–35)
MCV RBC AUTO: 86 FL (ref 81–99)
MONOCYTES # BLD AUTO: 1.3 10*3/UL (ref 0.2–0.8)
MONOCYTES NFR BLD AUTO: 7.7 % (ref 4.4–11.3)
NEUTS SEG NFR BLD AUTO: 88.4 % (ref 38.7–80)
NITRITE UR QL STRIP.AUTO: NEGATIVE
PH UR STRIP.AUTO: 5.5 [PH] (ref 5–7)
PLATELET # BLD AUTO: 331 X10E3/UL (ref 140–360)
POTASSIUM SERPL-SCNC: 4.1 MMOL/L (ref 3.5–5.1)
PROT SERPL-MCNC: 6.7 G/DL (ref 6.5–8.1)
PROT UR QL STRIP.AUTO: (no result)
RBC # BLD AUTO: 3.01 X10E6/UL (ref 4.3–5.7)
SODIUM SERPL-SCNC: 131 MMOL/L (ref 136–145)
SP GR UR STRIP: 1.01 (ref 1.01–1.02)
TROPONIN I SERPL DL<=0.01 NG/ML-MCNC: 0.08 NG/ML (ref 0–0.3)
UROBILINOGEN UR STRIP-MCNC: 0.2 MG/DL (ref 0.2–1)
WBC # BLD: 16.48 X10E3/UL (ref 4.8–10.8)
WBC #/AREA URNS HPF: >50 /HPF (ref 0–5)

## 2024-12-27 PROCEDURE — 87086 URINE CULTURE/COLONY COUNT: CPT

## 2024-12-27 PROCEDURE — 87040 BLOOD CULTURE FOR BACTERIA: CPT

## 2024-12-27 PROCEDURE — 81001 URINALYSIS AUTO W/SCOPE: CPT

## 2024-12-27 PROCEDURE — 84100 ASSAY OF PHOSPHORUS: CPT

## 2024-12-27 PROCEDURE — 84484 ASSAY OF TROPONIN QUANT: CPT

## 2024-12-27 PROCEDURE — 83735 ASSAY OF MAGNESIUM: CPT

## 2024-12-27 PROCEDURE — 84443 ASSAY THYROID STIM HORMONE: CPT

## 2024-12-27 PROCEDURE — 82607 VITAMIN B-12: CPT

## 2024-12-27 PROCEDURE — 71250 CT THORAX DX C-: CPT

## 2024-12-27 PROCEDURE — 99284 EMERGENCY DEPT VISIT MOD MDM: CPT

## 2024-12-27 PROCEDURE — 87071 CULTURE AEROBIC QUANT OTHER: CPT

## 2024-12-27 PROCEDURE — 82948 REAGENT STRIP/BLOOD GLUCOSE: CPT

## 2024-12-27 PROCEDURE — 71045 X-RAY EXAM CHEST 1 VIEW: CPT

## 2024-12-27 PROCEDURE — 84466 ASSAY OF TRANSFERRIN: CPT

## 2024-12-27 PROCEDURE — 74176 CT ABD & PELVIS W/O CONTRAST: CPT

## 2024-12-27 PROCEDURE — 82746 ASSAY OF FOLIC ACID SERUM: CPT

## 2024-12-27 PROCEDURE — 87205 SMEAR GRAM STAIN: CPT

## 2024-12-27 PROCEDURE — 70450 CT HEAD/BRAIN W/O DYE: CPT

## 2024-12-27 PROCEDURE — 82550 ASSAY OF CK (CPK): CPT

## 2024-12-27 PROCEDURE — 80048 BASIC METABOLIC PNL TOTAL CA: CPT

## 2024-12-27 PROCEDURE — 85025 COMPLETE CBC W/AUTO DIFF WBC: CPT

## 2024-12-27 PROCEDURE — 83036 HEMOGLOBIN GLYCOSYLATED A1C: CPT

## 2024-12-27 PROCEDURE — 83540 ASSAY OF IRON: CPT

## 2024-12-27 PROCEDURE — 36415 COLL VENOUS BLD VENIPUNCTURE: CPT

## 2024-12-27 PROCEDURE — 87186 SC STD MICRODIL/AGAR DIL: CPT

## 2024-12-27 PROCEDURE — 83605 ASSAY OF LACTIC ACID: CPT

## 2024-12-27 PROCEDURE — 94799 UNLISTED PULMONARY SVC/PX: CPT

## 2024-12-27 PROCEDURE — 93971 EXTREMITY STUDY: CPT

## 2024-12-27 PROCEDURE — 80053 COMPREHEN METABOLIC PANEL: CPT

## 2024-12-27 PROCEDURE — 93005 ELECTROCARDIOGRAM TRACING: CPT

## 2024-12-27 RX ADMIN — TAMSULOSIN HYDROCHLORIDE SCH MG: 0.4 CAPSULE ORAL at 16:14

## 2024-12-27 RX ADMIN — SODIUM CHLORIDE STA MG: 900 INJECTION INTRAVENOUS at 02:04

## 2024-12-27 RX ADMIN — PENTOXIFYLLINE SCH MG: 400 TABLET, FILM COATED, EXTENDED RELEASE ORAL at 11:53

## 2024-12-27 RX ADMIN — METOPROLOL TARTRATE SCH MG: 25 TABLET, FILM COATED ORAL at 09:30

## 2024-12-27 RX ADMIN — SODIUM CHLORIDE ONE MLS/HR: 9 INJECTION, SOLUTION INTRAVENOUS at 02:05

## 2024-12-27 RX ADMIN — PANTOPRAZOLE SODIUM SCH MG: 40 TABLET, DELAYED RELEASE ORAL at 16:14

## 2024-12-27 RX ADMIN — OXYCODONE HYDROCHLORIDE AND ACETAMINOPHEN SCH MG: 500 TABLET ORAL at 09:29

## 2024-12-27 RX ADMIN — DOXAZOSIN MESYLATE SCH MG: 2 TABLET ORAL at 21:30

## 2024-12-27 RX ADMIN — Medication SCH MG: at 21:30

## 2024-12-27 RX ADMIN — INSULIN HUMAN SCH UNIT: 100 INJECTION, SOLUTION PARENTERAL at 07:30

## 2024-12-27 RX ADMIN — ACETAMINOPHEN STA MG: 10 INJECTION, SOLUTION INTRAVENOUS at 02:04

## 2024-12-28 VITALS
OXYGEN SATURATION: 99 % | HEART RATE: 90 BPM | RESPIRATION RATE: 19 BRPM | TEMPERATURE: 98.2 F | SYSTOLIC BLOOD PRESSURE: 135 MMHG | DIASTOLIC BLOOD PRESSURE: 89 MMHG

## 2024-12-28 VITALS
DIASTOLIC BLOOD PRESSURE: 66 MMHG | TEMPERATURE: 98.1 F | RESPIRATION RATE: 18 BRPM | OXYGEN SATURATION: 100 % | SYSTOLIC BLOOD PRESSURE: 142 MMHG | HEART RATE: 78 BPM

## 2024-12-28 VITALS
TEMPERATURE: 98.4 F | RESPIRATION RATE: 18 BRPM | DIASTOLIC BLOOD PRESSURE: 51 MMHG | OXYGEN SATURATION: 98 % | SYSTOLIC BLOOD PRESSURE: 141 MMHG | HEART RATE: 84 BPM

## 2024-12-28 VITALS
TEMPERATURE: 99.1 F | OXYGEN SATURATION: 100 % | HEART RATE: 77 BPM | DIASTOLIC BLOOD PRESSURE: 60 MMHG | RESPIRATION RATE: 20 BRPM | SYSTOLIC BLOOD PRESSURE: 142 MMHG

## 2024-12-28 VITALS — SYSTOLIC BLOOD PRESSURE: 135 MMHG | DIASTOLIC BLOOD PRESSURE: 89 MMHG

## 2024-12-28 VITALS
TEMPERATURE: 97.1 F | DIASTOLIC BLOOD PRESSURE: 102 MMHG | RESPIRATION RATE: 18 BRPM | HEART RATE: 89 BPM | SYSTOLIC BLOOD PRESSURE: 135 MMHG | OXYGEN SATURATION: 100 %

## 2024-12-28 VITALS
SYSTOLIC BLOOD PRESSURE: 138 MMHG | RESPIRATION RATE: 18 BRPM | HEART RATE: 79 BPM | DIASTOLIC BLOOD PRESSURE: 53 MMHG | OXYGEN SATURATION: 99 % | TEMPERATURE: 100.3 F

## 2024-12-28 VITALS
SYSTOLIC BLOOD PRESSURE: 146 MMHG | HEART RATE: 90 BPM | RESPIRATION RATE: 19 BRPM | DIASTOLIC BLOOD PRESSURE: 62 MMHG | TEMPERATURE: 98.2 F | OXYGEN SATURATION: 99 %

## 2024-12-28 VITALS
HEART RATE: 79 BPM | DIASTOLIC BLOOD PRESSURE: 53 MMHG | OXYGEN SATURATION: 99 % | TEMPERATURE: 100.3 F | SYSTOLIC BLOOD PRESSURE: 138 MMHG | RESPIRATION RATE: 18 BRPM

## 2024-12-28 LAB
ALBUMIN SERPL-MCNC: 2.4 G/DL (ref 3.5–5)
ALBUMIN/GLOB SERPL: 0.6 {RATIO} (ref 0.8–2)
ALP SERPL-CCNC: 70 IU/L (ref 40–150)
ALT SERPL-CCNC: 38 IU/L (ref 0–55)
ANION GAP SERPL CALC-SCNC: 15.4 MMOL/L (ref 8–16)
BASOPHILS # BLD AUTO: 0 10*3/UL (ref 0–0.1)
BASOPHILS NFR BLD AUTO: 0.1 % (ref 0–1)
BILIRUB SERPL-MCNC: 0.5 MG/DL (ref 0.2–1.2)
BUN SERPL-MCNC: 48 MG/DL (ref 7–26)
BUN/CREAT SERPL: 34 (ref 6–25)
CALCIUM SERPL-MCNC: 8.6 MG/DL (ref 8.4–10.2)
CHLORIDE SERPL-SCNC: 102 MMOL/L (ref 98–107)
CO2 SERPL-SCNC: 17 MMOL/L (ref 22–29)
DEPRECATED NEUTROPHILS # BLD AUTO: 13.9 10*3/UL (ref 2.1–6.9)
EGFRCR SERPLBLD CKD-EPI 2021: 56 ML/MIN (ref 60–?)
EOSINOPHIL # BLD AUTO: 0 10*3/UL (ref 0–0.4)
EOSINOPHIL NFR BLD AUTO: 0 % (ref 0–6)
ERYTHROCYTE [DISTWIDTH] IN CORD BLOOD: 14.3 % (ref 11.7–14.4)
GLOBULIN PLAS-MCNC: 3.8 G/DL (ref 2.3–3.5)
GLUCOSE SERPLBLD-MCNC: 120 MG/DL (ref 74–118)
HCT VFR BLD AUTO: 25.3 % (ref 38.2–49.6)
HGB BLD-MCNC: 8 G/DL (ref 14–18)
IRON SATN MFR SERPL: 12 % (ref 15–50)
IRON SERPL-MCNC: 25 UG/DL (ref 65–175)
LYMPHOCYTES # BLD: 0.4 10*3/UL (ref 1–3.2)
LYMPHOCYTES NFR BLD AUTO: 2.8 % (ref 18–39.1)
MAGNESIUM SERPL-MCNC: 1.8 MG/DL (ref 1.3–2.1)
MCH RBC QN AUTO: 27.2 PG (ref 28–32)
MCHC RBC AUTO-ENTMCNC: 31.6 G/DL (ref 31–35)
MCV RBC AUTO: 86.1 FL (ref 81–99)
MONOCYTES # BLD AUTO: 1 10*3/UL (ref 0.2–0.8)
MONOCYTES NFR BLD AUTO: 6.7 % (ref 4.4–11.3)
NEUTS SEG NFR BLD AUTO: 89.9 % (ref 38.7–80)
PLATELET # BLD AUTO: 336 X10E3/UL (ref 140–360)
POTASSIUM SERPL-SCNC: 3.4 MMOL/L (ref 3.5–5.1)
PROT SERPL-MCNC: 6.2 G/DL (ref 6.5–8.1)
RBC # BLD AUTO: 2.94 X10E6/UL (ref 4.3–5.7)
SODIUM SERPL-SCNC: 131 MMOL/L (ref 136–145)
TIBC SERPL-MCNC: 209 UG/DL (ref 261–478)
TRANSFERRIN SERPL-MCNC: 149 MG/DL (ref 174–364)
TSH SERPL DL<=0.005 MIU/L-ACNC: 0.46 UIU/ML (ref 0.35–4.94)
WBC # BLD: 15.47 X10E3/UL (ref 4.8–10.8)

## 2024-12-28 RX ADMIN — Medication PRN MG: at 20:54

## 2024-12-29 VITALS
RESPIRATION RATE: 20 BRPM | TEMPERATURE: 98.2 F | SYSTOLIC BLOOD PRESSURE: 166 MMHG | DIASTOLIC BLOOD PRESSURE: 65 MMHG | HEART RATE: 71 BPM | OXYGEN SATURATION: 100 %

## 2024-12-29 VITALS
DIASTOLIC BLOOD PRESSURE: 61 MMHG | OXYGEN SATURATION: 99 % | SYSTOLIC BLOOD PRESSURE: 151 MMHG | TEMPERATURE: 98.6 F | HEART RATE: 69 BPM | RESPIRATION RATE: 18 BRPM

## 2024-12-29 VITALS
SYSTOLIC BLOOD PRESSURE: 157 MMHG | RESPIRATION RATE: 18 BRPM | HEART RATE: 82 BPM | OXYGEN SATURATION: 100 % | TEMPERATURE: 97.9 F | DIASTOLIC BLOOD PRESSURE: 52 MMHG

## 2024-12-29 VITALS
TEMPERATURE: 99.5 F | RESPIRATION RATE: 19 BRPM | HEART RATE: 84 BPM | SYSTOLIC BLOOD PRESSURE: 131 MMHG | DIASTOLIC BLOOD PRESSURE: 56 MMHG | OXYGEN SATURATION: 98 %

## 2024-12-29 VITALS
RESPIRATION RATE: 18 BRPM | OXYGEN SATURATION: 100 % | SYSTOLIC BLOOD PRESSURE: 131 MMHG | HEART RATE: 84 BPM | TEMPERATURE: 99.5 F | DIASTOLIC BLOOD PRESSURE: 56 MMHG

## 2024-12-29 VITALS
SYSTOLIC BLOOD PRESSURE: 154 MMHG | HEART RATE: 69 BPM | RESPIRATION RATE: 19 BRPM | OXYGEN SATURATION: 99 % | DIASTOLIC BLOOD PRESSURE: 69 MMHG | TEMPERATURE: 98.2 F

## 2024-12-29 VITALS
HEART RATE: 65 BPM | SYSTOLIC BLOOD PRESSURE: 131 MMHG | RESPIRATION RATE: 18 BRPM | TEMPERATURE: 97.5 F | DIASTOLIC BLOOD PRESSURE: 65 MMHG | OXYGEN SATURATION: 99 %

## 2024-12-29 VITALS
SYSTOLIC BLOOD PRESSURE: 151 MMHG | DIASTOLIC BLOOD PRESSURE: 61 MMHG | TEMPERATURE: 98.6 F | HEART RATE: 69 BPM | OXYGEN SATURATION: 99 % | RESPIRATION RATE: 18 BRPM

## 2024-12-29 LAB
CK SERPL-CCNC: 91 IU/L (ref 30–200)
TROPONIN I SERPL DL<=0.01 NG/ML-MCNC: 0.03 NG/ML (ref 0–0.3)

## 2024-12-29 RX ADMIN — SODIUM CHLORIDE SCH MLS/HR: 9 INJECTION, SOLUTION INTRAVENOUS at 10:33

## 2024-12-29 RX ADMIN — MEROPENEM SCH MLS/HR: 1 INJECTION INTRAVENOUS at 08:58

## 2024-12-29 RX ADMIN — Medication SCH MG: at 10:33

## 2024-12-30 VITALS
SYSTOLIC BLOOD PRESSURE: 160 MMHG | OXYGEN SATURATION: 100 % | HEART RATE: 79 BPM | TEMPERATURE: 100 F | DIASTOLIC BLOOD PRESSURE: 61 MMHG | RESPIRATION RATE: 19 BRPM

## 2024-12-30 VITALS
OXYGEN SATURATION: 100 % | TEMPERATURE: 100 F | RESPIRATION RATE: 19 BRPM | SYSTOLIC BLOOD PRESSURE: 160 MMHG | HEART RATE: 79 BPM | DIASTOLIC BLOOD PRESSURE: 61 MMHG

## 2024-12-30 VITALS
HEART RATE: 65 BPM | DIASTOLIC BLOOD PRESSURE: 70 MMHG | RESPIRATION RATE: 18 BRPM | TEMPERATURE: 98.2 F | SYSTOLIC BLOOD PRESSURE: 170 MMHG | OXYGEN SATURATION: 100 %

## 2024-12-30 VITALS
TEMPERATURE: 98 F | HEART RATE: 71 BPM | OXYGEN SATURATION: 100 % | SYSTOLIC BLOOD PRESSURE: 165 MMHG | DIASTOLIC BLOOD PRESSURE: 85 MMHG | RESPIRATION RATE: 18 BRPM

## 2024-12-30 VITALS
SYSTOLIC BLOOD PRESSURE: 135 MMHG | TEMPERATURE: 99 F | HEART RATE: 74 BPM | OXYGEN SATURATION: 100 % | RESPIRATION RATE: 19 BRPM | DIASTOLIC BLOOD PRESSURE: 59 MMHG

## 2024-12-30 VITALS
TEMPERATURE: 98.2 F | OXYGEN SATURATION: 100 % | DIASTOLIC BLOOD PRESSURE: 58 MMHG | RESPIRATION RATE: 18 BRPM | HEART RATE: 69 BPM | SYSTOLIC BLOOD PRESSURE: 138 MMHG

## 2024-12-30 VITALS
OXYGEN SATURATION: 99 % | DIASTOLIC BLOOD PRESSURE: 62 MMHG | HEART RATE: 69 BPM | SYSTOLIC BLOOD PRESSURE: 151 MMHG | RESPIRATION RATE: 18 BRPM | TEMPERATURE: 98.6 F

## 2024-12-30 VITALS
DIASTOLIC BLOOD PRESSURE: 61 MMHG | RESPIRATION RATE: 19 BRPM | SYSTOLIC BLOOD PRESSURE: 124 MMHG | OXYGEN SATURATION: 100 % | TEMPERATURE: 98.2 F | HEART RATE: 72 BPM

## 2024-12-30 VITALS
TEMPERATURE: 98 F | HEART RATE: 71 BPM | SYSTOLIC BLOOD PRESSURE: 165 MMHG | RESPIRATION RATE: 18 BRPM | DIASTOLIC BLOOD PRESSURE: 85 MMHG | OXYGEN SATURATION: 100 %

## 2024-12-30 LAB
ANION GAP SERPL CALC-SCNC: 13.3 MMOL/L (ref 8–16)
BASOPHILS # BLD AUTO: 0 10*3/UL (ref 0–0.1)
BASOPHILS NFR BLD AUTO: 0.2 % (ref 0–1)
BUN SERPL-MCNC: 40 MG/DL (ref 7–26)
BUN/CREAT SERPL: 32 (ref 6–25)
CALCIUM SERPL-MCNC: 8.3 MG/DL (ref 8.4–10.2)
CHLORIDE SERPL-SCNC: 103 MMOL/L (ref 98–107)
CO2 SERPL-SCNC: 20 MMOL/L (ref 22–29)
DEPRECATED NEUTROPHILS # BLD AUTO: 7.7 10*3/UL (ref 2.1–6.9)
EGFRCR SERPLBLD CKD-EPI 2021: 64 ML/MIN (ref 60–?)
EOSINOPHIL # BLD AUTO: 0 10*3/UL (ref 0–0.4)
EOSINOPHIL NFR BLD AUTO: 0.3 % (ref 0–6)
ERYTHROCYTE [DISTWIDTH] IN CORD BLOOD: 14.5 % (ref 11.7–14.4)
GLUCOSE SERPLBLD-MCNC: 118 MG/DL (ref 74–118)
HCT VFR BLD AUTO: 22.1 % (ref 38.2–49.6)
HGB BLD-MCNC: 7.4 G/DL (ref 14–18)
LYMPHOCYTES # BLD: 0.9 10*3/UL (ref 1–3.2)
LYMPHOCYTES NFR BLD AUTO: 9.4 % (ref 18–39.1)
MCH RBC QN AUTO: 27 PG (ref 28–32)
MCHC RBC AUTO-ENTMCNC: 33.5 G/DL (ref 31–35)
MCV RBC AUTO: 80.7 FL (ref 81–99)
MONOCYTES # BLD AUTO: 1 10*3/UL (ref 0.2–0.8)
MONOCYTES NFR BLD AUTO: 9.8 % (ref 4.4–11.3)
NEUTS SEG NFR BLD AUTO: 79.2 % (ref 38.7–80)
PLATELET # BLD AUTO: 368 X10E3/UL (ref 140–360)
POTASSIUM SERPL-SCNC: 3.3 MMOL/L (ref 3.5–5.1)
RBC # BLD AUTO: 2.74 X10E6/UL (ref 4.3–5.7)
SODIUM SERPL-SCNC: 133 MMOL/L (ref 136–145)
WBC # BLD: 9.7 X10E3/UL (ref 4.8–10.8)

## 2024-12-30 RX ADMIN — MEROPENEM SCH MLS/HR: 1 INJECTION INTRAVENOUS at 17:00

## 2024-12-30 RX ADMIN — MEROPENEM SCH MLS/HR: 1 INJECTION INTRAVENOUS at 12:13

## 2024-12-31 VITALS
TEMPERATURE: 98.8 F | SYSTOLIC BLOOD PRESSURE: 135 MMHG | DIASTOLIC BLOOD PRESSURE: 47 MMHG | RESPIRATION RATE: 18 BRPM | OXYGEN SATURATION: 99 % | HEART RATE: 77 BPM

## 2024-12-31 VITALS
DIASTOLIC BLOOD PRESSURE: 46 MMHG | SYSTOLIC BLOOD PRESSURE: 131 MMHG | HEART RATE: 80 BPM | RESPIRATION RATE: 18 BRPM | TEMPERATURE: 98.9 F | OXYGEN SATURATION: 98 %

## 2024-12-31 VITALS
TEMPERATURE: 98.2 F | OXYGEN SATURATION: 100 % | RESPIRATION RATE: 18 BRPM | HEART RATE: 72 BPM | DIASTOLIC BLOOD PRESSURE: 74 MMHG | SYSTOLIC BLOOD PRESSURE: 175 MMHG

## 2024-12-31 VITALS
TEMPERATURE: 98.5 F | HEART RATE: 75 BPM | DIASTOLIC BLOOD PRESSURE: 44 MMHG | OXYGEN SATURATION: 99 % | SYSTOLIC BLOOD PRESSURE: 116 MMHG | RESPIRATION RATE: 18 BRPM

## 2024-12-31 VITALS
SYSTOLIC BLOOD PRESSURE: 136 MMHG | HEART RATE: 81 BPM | OXYGEN SATURATION: 100 % | DIASTOLIC BLOOD PRESSURE: 56 MMHG | TEMPERATURE: 98.4 F | RESPIRATION RATE: 18 BRPM

## 2024-12-31 VITALS
SYSTOLIC BLOOD PRESSURE: 136 MMHG | DIASTOLIC BLOOD PRESSURE: 56 MMHG | OXYGEN SATURATION: 100 % | TEMPERATURE: 98.4 F | HEART RATE: 81 BPM | RESPIRATION RATE: 18 BRPM

## 2024-12-31 VITALS
DIASTOLIC BLOOD PRESSURE: 46 MMHG | TEMPERATURE: 98.9 F | SYSTOLIC BLOOD PRESSURE: 131 MMHG | RESPIRATION RATE: 18 BRPM | HEART RATE: 80 BPM | OXYGEN SATURATION: 98 %

## 2024-12-31 RX ADMIN — HYDRALAZINE HYDROCHLORIDE PRN MG: 20 INJECTION INTRAMUSCULAR; INTRAVENOUS at 04:51

## 2025-01-01 VITALS
DIASTOLIC BLOOD PRESSURE: 60 MMHG | OXYGEN SATURATION: 100 % | SYSTOLIC BLOOD PRESSURE: 157 MMHG | RESPIRATION RATE: 18 BRPM | TEMPERATURE: 98.4 F | HEART RATE: 75 BPM

## 2025-01-01 VITALS
RESPIRATION RATE: 18 BRPM | DIASTOLIC BLOOD PRESSURE: 54 MMHG | TEMPERATURE: 98.8 F | SYSTOLIC BLOOD PRESSURE: 149 MMHG | OXYGEN SATURATION: 100 % | HEART RATE: 82 BPM

## 2025-01-01 VITALS
TEMPERATURE: 98.8 F | DIASTOLIC BLOOD PRESSURE: 64 MMHG | OXYGEN SATURATION: 100 % | RESPIRATION RATE: 18 BRPM | HEART RATE: 78 BPM | SYSTOLIC BLOOD PRESSURE: 170 MMHG

## 2025-01-01 VITALS
TEMPERATURE: 98.2 F | DIASTOLIC BLOOD PRESSURE: 70 MMHG | RESPIRATION RATE: 18 BRPM | SYSTOLIC BLOOD PRESSURE: 148 MMHG | OXYGEN SATURATION: 100 % | HEART RATE: 80 BPM

## 2025-01-01 VITALS
SYSTOLIC BLOOD PRESSURE: 142 MMHG | DIASTOLIC BLOOD PRESSURE: 65 MMHG | HEART RATE: 78 BPM | TEMPERATURE: 97.9 F | RESPIRATION RATE: 18 BRPM | OXYGEN SATURATION: 97 %

## 2025-01-01 VITALS
SYSTOLIC BLOOD PRESSURE: 142 MMHG | DIASTOLIC BLOOD PRESSURE: 65 MMHG | HEART RATE: 78 BPM | TEMPERATURE: 97.9 F | OXYGEN SATURATION: 97 % | RESPIRATION RATE: 18 BRPM

## 2025-01-01 VITALS
SYSTOLIC BLOOD PRESSURE: 157 MMHG | TEMPERATURE: 98.4 F | HEART RATE: 75 BPM | RESPIRATION RATE: 18 BRPM | DIASTOLIC BLOOD PRESSURE: 60 MMHG | OXYGEN SATURATION: 100 %

## 2025-01-01 VITALS
TEMPERATURE: 98.4 F | SYSTOLIC BLOOD PRESSURE: 137 MMHG | RESPIRATION RATE: 18 BRPM | DIASTOLIC BLOOD PRESSURE: 59 MMHG | OXYGEN SATURATION: 100 % | HEART RATE: 71 BPM

## 2025-01-01 VITALS
OXYGEN SATURATION: 100 % | TEMPERATURE: 99.3 F | SYSTOLIC BLOOD PRESSURE: 152 MMHG | HEART RATE: 94 BPM | DIASTOLIC BLOOD PRESSURE: 60 MMHG | RESPIRATION RATE: 16 BRPM

## 2025-01-01 LAB
ANION GAP SERPL CALC-SCNC: 13.4 MMOL/L (ref 8–16)
BASOPHILS # BLD AUTO: 0.1 10*3/UL (ref 0–0.1)
BASOPHILS NFR BLD AUTO: 0.7 % (ref 0–1)
BUN SERPL-MCNC: 25 MG/DL (ref 7–26)
BUN/CREAT SERPL: 19 (ref 6–25)
CALCIUM SERPL-MCNC: 8.7 MG/DL (ref 8.4–10.2)
CHLORIDE SERPL-SCNC: 104 MMOL/L (ref 98–107)
CO2 SERPL-SCNC: 23 MMOL/L (ref 22–29)
DEPRECATED NEUTROPHILS # BLD AUTO: 6.1 10*3/UL (ref 2.1–6.9)
EGFRCR SERPLBLD CKD-EPI 2021: 61 ML/MIN (ref 60–?)
EOSINOPHIL # BLD AUTO: 0.3 10*3/UL (ref 0–0.4)
EOSINOPHIL NFR BLD AUTO: 3.3 % (ref 0–6)
ERYTHROCYTE [DISTWIDTH] IN CORD BLOOD: 14.7 % (ref 11.7–14.4)
GLUCOSE SERPLBLD-MCNC: 233 MG/DL (ref 74–118)
HCT VFR BLD AUTO: 24.7 % (ref 38.2–49.6)
HGB BLD-MCNC: 7.6 G/DL (ref 14–18)
LYMPHOCYTES # BLD: 1.3 10*3/UL (ref 1–3.2)
LYMPHOCYTES NFR BLD AUTO: 14 % (ref 18–39.1)
MCH RBC QN AUTO: 26.8 PG (ref 28–32)
MCHC RBC AUTO-ENTMCNC: 30.8 G/DL (ref 31–35)
MCV RBC AUTO: 87 FL (ref 81–99)
MONOCYTES # BLD AUTO: 1.3 10*3/UL (ref 0.2–0.8)
MONOCYTES NFR BLD AUTO: 14.1 % (ref 4.4–11.3)
NEUTS SEG NFR BLD AUTO: 66.4 % (ref 38.7–80)
PLATELET # BLD AUTO: 482 X10E3/UL (ref 140–360)
POTASSIUM SERPL-SCNC: 4.4 MMOL/L (ref 3.5–5.1)
RBC # BLD AUTO: 2.84 X10E6/UL (ref 4.3–5.7)
SODIUM SERPL-SCNC: 136 MMOL/L (ref 136–145)
WBC # BLD: 9.15 X10E3/UL (ref 4.8–10.8)

## 2025-01-02 VITALS
RESPIRATION RATE: 18 BRPM | SYSTOLIC BLOOD PRESSURE: 168 MMHG | TEMPERATURE: 98.4 F | HEART RATE: 75 BPM | OXYGEN SATURATION: 100 % | DIASTOLIC BLOOD PRESSURE: 81 MMHG

## 2025-01-02 VITALS
DIASTOLIC BLOOD PRESSURE: 60 MMHG | SYSTOLIC BLOOD PRESSURE: 141 MMHG | RESPIRATION RATE: 18 BRPM | OXYGEN SATURATION: 100 % | TEMPERATURE: 98.2 F | HEART RATE: 100 BPM

## 2025-01-02 VITALS
RESPIRATION RATE: 18 BRPM | HEART RATE: 79 BPM | SYSTOLIC BLOOD PRESSURE: 182 MMHG | TEMPERATURE: 99.8 F | DIASTOLIC BLOOD PRESSURE: 71 MMHG | OXYGEN SATURATION: 100 %

## 2025-01-02 VITALS
RESPIRATION RATE: 18 BRPM | DIASTOLIC BLOOD PRESSURE: 56 MMHG | OXYGEN SATURATION: 100 % | TEMPERATURE: 98.8 F | HEART RATE: 74 BPM | SYSTOLIC BLOOD PRESSURE: 133 MMHG

## 2025-01-02 VITALS
HEART RATE: 74 BPM | RESPIRATION RATE: 18 BRPM | TEMPERATURE: 98.8 F | SYSTOLIC BLOOD PRESSURE: 133 MMHG | DIASTOLIC BLOOD PRESSURE: 56 MMHG | OXYGEN SATURATION: 100 %

## 2025-01-02 VITALS
SYSTOLIC BLOOD PRESSURE: 188 MMHG | OXYGEN SATURATION: 99 % | RESPIRATION RATE: 19 BRPM | DIASTOLIC BLOOD PRESSURE: 69 MMHG | TEMPERATURE: 98.3 F | HEART RATE: 79 BPM

## 2025-01-02 LAB
ANION GAP SERPL CALC-SCNC: 13.8 MMOL/L (ref 8–16)
BASOPHILS # BLD AUTO: 0.1 10*3/UL (ref 0–0.1)
BASOPHILS NFR BLD AUTO: 0.6 % (ref 0–1)
BUN SERPL-MCNC: 25 MG/DL (ref 7–26)
BUN/CREAT SERPL: 23 (ref 6–25)
CALCIUM SERPL-MCNC: 8.6 MG/DL (ref 8.4–10.2)
CHLORIDE SERPL-SCNC: 107 MMOL/L (ref 98–107)
CO2 SERPL-SCNC: 22 MMOL/L (ref 22–29)
DEPRECATED NEUTROPHILS # BLD AUTO: 5.8 10*3/UL (ref 2.1–6.9)
EGFRCR SERPLBLD CKD-EPI 2021: 77 ML/MIN (ref 60–?)
EOSINOPHIL # BLD AUTO: 0.3 10*3/UL (ref 0–0.4)
EOSINOPHIL NFR BLD AUTO: 2.9 % (ref 0–6)
ERYTHROCYTE [DISTWIDTH] IN CORD BLOOD: 14.8 % (ref 11.7–14.4)
GLUCOSE SERPLBLD-MCNC: 123 MG/DL (ref 74–118)
HCT VFR BLD AUTO: 24.5 % (ref 38.2–49.6)
HGB BLD-MCNC: 7.5 G/DL (ref 14–18)
LYMPHOCYTES # BLD: 1.7 10*3/UL (ref 1–3.2)
LYMPHOCYTES NFR BLD AUTO: 18.8 % (ref 18–39.1)
MCH RBC QN AUTO: 26.8 PG (ref 28–32)
MCHC RBC AUTO-ENTMCNC: 30.6 G/DL (ref 31–35)
MCV RBC AUTO: 87.5 FL (ref 81–99)
MONOCYTES # BLD AUTO: 1.2 10*3/UL (ref 0.2–0.8)
MONOCYTES NFR BLD AUTO: 12.9 % (ref 4.4–11.3)
NEUTS SEG NFR BLD AUTO: 62.3 % (ref 38.7–80)
PLATELET # BLD AUTO: 409 X10E3/UL (ref 140–360)
POTASSIUM SERPL-SCNC: 3.8 MMOL/L (ref 3.5–5.1)
RBC # BLD AUTO: 2.8 X10E6/UL (ref 4.3–5.7)
SODIUM SERPL-SCNC: 139 MMOL/L (ref 136–145)
WBC # BLD: 9.28 X10E3/UL (ref 4.8–10.8)

## 2025-01-03 VITALS
SYSTOLIC BLOOD PRESSURE: 174 MMHG | DIASTOLIC BLOOD PRESSURE: 72 MMHG | HEART RATE: 79 BPM | TEMPERATURE: 98.2 F | RESPIRATION RATE: 18 BRPM | OXYGEN SATURATION: 100 %

## 2025-01-03 VITALS
RESPIRATION RATE: 18 BRPM | HEART RATE: 79 BPM | DIASTOLIC BLOOD PRESSURE: 72 MMHG | TEMPERATURE: 98.2 F | SYSTOLIC BLOOD PRESSURE: 174 MMHG | OXYGEN SATURATION: 100 %

## 2025-01-03 VITALS
HEART RATE: 77 BPM | SYSTOLIC BLOOD PRESSURE: 136 MMHG | OXYGEN SATURATION: 98 % | DIASTOLIC BLOOD PRESSURE: 56 MMHG | TEMPERATURE: 98.5 F | RESPIRATION RATE: 20 BRPM

## 2025-01-03 VITALS
HEART RATE: 80 BPM | TEMPERATURE: 98.2 F | RESPIRATION RATE: 18 BRPM | DIASTOLIC BLOOD PRESSURE: 62 MMHG | SYSTOLIC BLOOD PRESSURE: 173 MMHG | OXYGEN SATURATION: 100 %

## 2025-01-03 VITALS
DIASTOLIC BLOOD PRESSURE: 57 MMHG | OXYGEN SATURATION: 99 % | HEART RATE: 70 BPM | SYSTOLIC BLOOD PRESSURE: 113 MMHG | RESPIRATION RATE: 18 BRPM | TEMPERATURE: 98.2 F

## 2025-01-03 LAB
FOLATE SERPL-MCNC: 16.8 NG/ML (ref 7–15.4)
VIT B12 BLD-MCNC: > 2000 PG/ML (ref 213–816)

## 2025-01-03 RX ADMIN — SODIUM CHLORIDE SCH MLS/HR: 9 INJECTION, SOLUTION INTRAVENOUS at 10:20

## 2025-01-03 RX ADMIN — Medication ONE MG: at 10:22

## 2025-01-03 RX ADMIN — Medication SCH MG: at 10:19

## 2025-01-04 VITALS
HEART RATE: 82 BPM | TEMPERATURE: 98.4 F | DIASTOLIC BLOOD PRESSURE: 75 MMHG | OXYGEN SATURATION: 100 % | SYSTOLIC BLOOD PRESSURE: 166 MMHG | RESPIRATION RATE: 19 BRPM

## 2025-01-04 VITALS
OXYGEN SATURATION: 100 % | HEART RATE: 75 BPM | TEMPERATURE: 97.5 F | DIASTOLIC BLOOD PRESSURE: 56 MMHG | RESPIRATION RATE: 18 BRPM | SYSTOLIC BLOOD PRESSURE: 139 MMHG

## 2025-01-04 VITALS
TEMPERATURE: 98 F | DIASTOLIC BLOOD PRESSURE: 69 MMHG | RESPIRATION RATE: 18 BRPM | SYSTOLIC BLOOD PRESSURE: 155 MMHG | OXYGEN SATURATION: 100 % | HEART RATE: 75 BPM

## 2025-01-04 VITALS — DIASTOLIC BLOOD PRESSURE: 70 MMHG | HEART RATE: 76 BPM | SYSTOLIC BLOOD PRESSURE: 157 MMHG

## 2025-01-04 VITALS
HEART RATE: 64 BPM | OXYGEN SATURATION: 100 % | RESPIRATION RATE: 18 BRPM | TEMPERATURE: 98.5 F | DIASTOLIC BLOOD PRESSURE: 63 MMHG | SYSTOLIC BLOOD PRESSURE: 159 MMHG

## 2025-01-04 LAB
ALBUMIN SERPL-MCNC: 2.7 G/DL (ref 3.5–5)
ALBUMIN/GLOB SERPL: 0.7 {RATIO} (ref 0.8–2)
ALP SERPL-CCNC: 85 IU/L (ref 40–150)
ALT SERPL-CCNC: 40 IU/L (ref 0–55)
ANION GAP SERPL CALC-SCNC: 15.1 MMOL/L (ref 8–16)
BASOPHILS # BLD AUTO: 0.1 10*3/UL (ref 0–0.1)
BASOPHILS NFR BLD AUTO: 0.7 % (ref 0–1)
BILIRUB SERPL-MCNC: 0.2 MG/DL (ref 0.2–1.2)
BUN SERPL-MCNC: 22 MG/DL (ref 7–26)
BUN/CREAT SERPL: 21 (ref 6–25)
CALCIUM SERPL-MCNC: 9.2 MG/DL (ref 8.4–10.2)
CHLORIDE SERPL-SCNC: 105 MMOL/L (ref 98–107)
CO2 SERPL-SCNC: 24 MMOL/L (ref 22–29)
DEPRECATED NEUTROPHILS # BLD AUTO: 5.4 10*3/UL (ref 2.1–6.9)
EGFRCR SERPLBLD CKD-EPI 2021: 80 ML/MIN (ref 60–?)
EOSINOPHIL # BLD AUTO: 0.3 10*3/UL (ref 0–0.4)
EOSINOPHIL NFR BLD AUTO: 3.2 % (ref 0–6)
ERYTHROCYTE [DISTWIDTH] IN CORD BLOOD: 15 % (ref 11.7–14.4)
GLOBULIN PLAS-MCNC: 4.1 G/DL (ref 2.3–3.5)
GLUCOSE SERPLBLD-MCNC: 122 MG/DL (ref 74–118)
HCT VFR BLD AUTO: 23.9 % (ref 38.2–49.6)
HGB BLD-MCNC: 7.8 G/DL (ref 14–18)
LYMPHOCYTES # BLD: 1.9 10*3/UL (ref 1–3.2)
LYMPHOCYTES NFR BLD AUTO: 21.1 % (ref 18–39.1)
MCH RBC QN AUTO: 27.3 PG (ref 28–32)
MCHC RBC AUTO-ENTMCNC: 32.6 G/DL (ref 31–35)
MCV RBC AUTO: 83.6 FL (ref 81–99)
MONOCYTES # BLD AUTO: 0.8 10*3/UL (ref 0.2–0.8)
MONOCYTES NFR BLD AUTO: 9.6 % (ref 4.4–11.3)
NEUTS SEG NFR BLD AUTO: 61.4 % (ref 38.7–80)
PLATELET # BLD AUTO: 545 X10E3/UL (ref 140–360)
POTASSIUM SERPL-SCNC: 4.1 MMOL/L (ref 3.5–5.1)
PROT SERPL-MCNC: 6.8 G/DL (ref 6.5–8.1)
RBC # BLD AUTO: 2.86 X10E6/UL (ref 4.3–5.7)
SODIUM SERPL-SCNC: 140 MMOL/L (ref 136–145)
WBC # BLD: 8.75 X10E3/UL (ref 4.8–10.8)